# Patient Record
Sex: MALE | Race: OTHER | HISPANIC OR LATINO
[De-identification: names, ages, dates, MRNs, and addresses within clinical notes are randomized per-mention and may not be internally consistent; named-entity substitution may affect disease eponyms.]

---

## 2017-06-27 ENCOUNTER — FORM ENCOUNTER (OUTPATIENT)
Age: 82
End: 2017-06-27

## 2017-09-19 ENCOUNTER — FORM ENCOUNTER (OUTPATIENT)
Age: 82
End: 2017-09-19

## 2021-11-25 ENCOUNTER — INPATIENT (INPATIENT)
Facility: HOSPITAL | Age: 86
LOS: 3 days | Discharge: HOME | DRG: 177 | End: 2021-11-29
Attending: STUDENT IN AN ORGANIZED HEALTH CARE EDUCATION/TRAINING PROGRAM | Admitting: STUDENT IN AN ORGANIZED HEALTH CARE EDUCATION/TRAINING PROGRAM
Payer: COMMERCIAL

## 2021-11-25 VITALS
WEIGHT: 147.05 LBS | RESPIRATION RATE: 18 BRPM | OXYGEN SATURATION: 95 % | HEART RATE: 118 BPM | SYSTOLIC BLOOD PRESSURE: 119 MMHG | HEIGHT: 65 IN | DIASTOLIC BLOOD PRESSURE: 74 MMHG | TEMPERATURE: 103 F

## 2021-11-25 DIAGNOSIS — U07.1 COVID-19: ICD-10-CM

## 2021-11-25 DIAGNOSIS — Z29.9 ENCOUNTER FOR PROPHYLACTIC MEASURES, UNSPECIFIED: ICD-10-CM

## 2021-11-25 DIAGNOSIS — Z78.9 OTHER SPECIFIED HEALTH STATUS: Chronic | ICD-10-CM

## 2021-11-25 DIAGNOSIS — J96.01 ACUTE RESPIRATORY FAILURE WITH HYPOXIA: ICD-10-CM

## 2021-11-25 DIAGNOSIS — N40.0 BENIGN PROSTATIC HYPERPLASIA WITHOUT LOWER URINARY TRACT SYMPTOMS: ICD-10-CM

## 2021-11-25 LAB
ALBUMIN SERPL ELPH-MCNC: 3.5 G/DL — SIGNIFICANT CHANGE UP (ref 3.5–5)
ALP SERPL-CCNC: 93 U/L — SIGNIFICANT CHANGE UP (ref 40–120)
ALT FLD-CCNC: 22 U/L DA — SIGNIFICANT CHANGE UP (ref 10–60)
ANION GAP SERPL CALC-SCNC: 6 MMOL/L — SIGNIFICANT CHANGE UP (ref 5–17)
APPEARANCE UR: CLEAR — SIGNIFICANT CHANGE UP
APTT BLD: 34.3 SEC — SIGNIFICANT CHANGE UP (ref 27.5–35.5)
AST SERPL-CCNC: 18 U/L — SIGNIFICANT CHANGE UP (ref 10–40)
BASOPHILS # BLD AUTO: 0.04 K/UL — SIGNIFICANT CHANGE UP (ref 0–0.2)
BASOPHILS NFR BLD AUTO: 0.4 % — SIGNIFICANT CHANGE UP (ref 0–2)
BILIRUB SERPL-MCNC: 0.8 MG/DL — SIGNIFICANT CHANGE UP (ref 0.2–1.2)
BILIRUB UR-MCNC: NEGATIVE — SIGNIFICANT CHANGE UP
BUN SERPL-MCNC: 14 MG/DL — SIGNIFICANT CHANGE UP (ref 7–18)
CALCIUM SERPL-MCNC: 8.9 MG/DL — SIGNIFICANT CHANGE UP (ref 8.4–10.5)
CHLORIDE SERPL-SCNC: 101 MMOL/L — SIGNIFICANT CHANGE UP (ref 96–108)
CO2 SERPL-SCNC: 27 MMOL/L — SIGNIFICANT CHANGE UP (ref 22–31)
COLOR SPEC: YELLOW — SIGNIFICANT CHANGE UP
CREAT SERPL-MCNC: 1.21 MG/DL — SIGNIFICANT CHANGE UP (ref 0.5–1.3)
DIFF PNL FLD: ABNORMAL
EOSINOPHIL # BLD AUTO: 0.03 K/UL — SIGNIFICANT CHANGE UP (ref 0–0.5)
EOSINOPHIL NFR BLD AUTO: 0.3 % — SIGNIFICANT CHANGE UP (ref 0–6)
GLUCOSE SERPL-MCNC: 214 MG/DL — HIGH (ref 70–99)
GLUCOSE UR QL: 50 MG/DL
HCT VFR BLD CALC: 48.4 % — SIGNIFICANT CHANGE UP (ref 39–50)
HGB BLD-MCNC: 16 G/DL — SIGNIFICANT CHANGE UP (ref 13–17)
IMM GRANULOCYTES NFR BLD AUTO: 0.4 % — SIGNIFICANT CHANGE UP (ref 0–1.5)
INR BLD: 1.16 RATIO — SIGNIFICANT CHANGE UP (ref 0.88–1.16)
KETONES UR-MCNC: ABNORMAL
LACTATE SERPL-SCNC: 1.9 MMOL/L — SIGNIFICANT CHANGE UP (ref 0.7–2)
LEUKOCYTE ESTERASE UR-ACNC: NEGATIVE — SIGNIFICANT CHANGE UP
LYMPHOCYTES # BLD AUTO: 0.62 K/UL — LOW (ref 1–3.3)
LYMPHOCYTES # BLD AUTO: 5.9 % — LOW (ref 13–44)
MCHC RBC-ENTMCNC: 30 PG — SIGNIFICANT CHANGE UP (ref 27–34)
MCHC RBC-ENTMCNC: 33.1 GM/DL — SIGNIFICANT CHANGE UP (ref 32–36)
MCV RBC AUTO: 90.6 FL — SIGNIFICANT CHANGE UP (ref 80–100)
MONOCYTES # BLD AUTO: 0.54 K/UL — SIGNIFICANT CHANGE UP (ref 0–0.9)
MONOCYTES NFR BLD AUTO: 5.2 % — SIGNIFICANT CHANGE UP (ref 2–14)
NEUTROPHILS # BLD AUTO: 9.16 K/UL — HIGH (ref 1.8–7.4)
NEUTROPHILS NFR BLD AUTO: 87.8 % — HIGH (ref 43–77)
NITRITE UR-MCNC: NEGATIVE — SIGNIFICANT CHANGE UP
NRBC # BLD: 0 /100 WBCS — SIGNIFICANT CHANGE UP (ref 0–0)
PH UR: 5 — SIGNIFICANT CHANGE UP (ref 5–8)
PLATELET # BLD AUTO: 276 K/UL — SIGNIFICANT CHANGE UP (ref 150–400)
POTASSIUM SERPL-MCNC: 4.2 MMOL/L — SIGNIFICANT CHANGE UP (ref 3.5–5.3)
POTASSIUM SERPL-SCNC: 4.2 MMOL/L — SIGNIFICANT CHANGE UP (ref 3.5–5.3)
PROT SERPL-MCNC: 7.1 G/DL — SIGNIFICANT CHANGE UP (ref 6–8.3)
PROT UR-MCNC: 30 MG/DL
PROTHROM AB SERPL-ACNC: 13.7 SEC — HIGH (ref 10.6–13.6)
RBC # BLD: 5.34 M/UL — SIGNIFICANT CHANGE UP (ref 4.2–5.8)
RBC # FLD: 13.8 % — SIGNIFICANT CHANGE UP (ref 10.3–14.5)
SARS-COV-2 RNA SPEC QL NAA+PROBE: DETECTED
SODIUM SERPL-SCNC: 134 MMOL/L — LOW (ref 135–145)
SP GR SPEC: 1.02 — SIGNIFICANT CHANGE UP (ref 1.01–1.02)
UROBILINOGEN FLD QL: 1
WBC # BLD: 10.43 K/UL — SIGNIFICANT CHANGE UP (ref 3.8–10.5)
WBC # FLD AUTO: 10.43 K/UL — SIGNIFICANT CHANGE UP (ref 3.8–10.5)

## 2021-11-25 PROCEDURE — 71045 X-RAY EXAM CHEST 1 VIEW: CPT | Mod: 26

## 2021-11-25 PROCEDURE — 99285 EMERGENCY DEPT VISIT HI MDM: CPT

## 2021-11-25 PROCEDURE — 99223 1ST HOSP IP/OBS HIGH 75: CPT

## 2021-11-25 RX ORDER — ACETAMINOPHEN 500 MG
650 TABLET ORAL ONCE
Refills: 0 | Status: COMPLETED | OUTPATIENT
Start: 2021-11-25 | End: 2021-11-25

## 2021-11-25 RX ORDER — SODIUM CHLORIDE 9 MG/ML
1000 INJECTION, SOLUTION INTRAVENOUS
Refills: 0 | Status: DISCONTINUED | OUTPATIENT
Start: 2021-11-25 | End: 2021-11-29

## 2021-11-25 RX ORDER — DEXTROSE 50 % IN WATER 50 %
15 SYRINGE (ML) INTRAVENOUS ONCE
Refills: 0 | Status: DISCONTINUED | OUTPATIENT
Start: 2021-11-25 | End: 2021-11-29

## 2021-11-25 RX ORDER — ASPIRIN/CALCIUM CARB/MAGNESIUM 324 MG
81 TABLET ORAL DAILY
Refills: 0 | Status: DISCONTINUED | OUTPATIENT
Start: 2021-11-25 | End: 2021-11-29

## 2021-11-25 RX ORDER — ALBUTEROL 90 UG/1
2 AEROSOL, METERED ORAL EVERY 6 HOURS
Refills: 0 | Status: DISCONTINUED | OUTPATIENT
Start: 2021-11-25 | End: 2021-11-29

## 2021-11-25 RX ORDER — DEXAMETHASONE 0.5 MG/5ML
6 ELIXIR ORAL DAILY
Refills: 0 | Status: DISCONTINUED | OUTPATIENT
Start: 2021-11-25 | End: 2021-11-29

## 2021-11-25 RX ORDER — REMDESIVIR 5 MG/ML
200 INJECTION INTRAVENOUS EVERY 24 HOURS
Refills: 0 | Status: COMPLETED | OUTPATIENT
Start: 2021-11-25 | End: 2021-11-26

## 2021-11-25 RX ORDER — TAMSULOSIN HYDROCHLORIDE 0.4 MG/1
0.4 CAPSULE ORAL AT BEDTIME
Refills: 0 | Status: DISCONTINUED | OUTPATIENT
Start: 2021-11-25 | End: 2021-11-29

## 2021-11-25 RX ORDER — REMDESIVIR 5 MG/ML
INJECTION INTRAVENOUS
Refills: 0 | Status: DISCONTINUED | OUTPATIENT
Start: 2021-11-25 | End: 2021-11-29

## 2021-11-25 RX ORDER — GLUCAGON INJECTION, SOLUTION 0.5 MG/.1ML
1 INJECTION, SOLUTION SUBCUTANEOUS ONCE
Refills: 0 | Status: DISCONTINUED | OUTPATIENT
Start: 2021-11-25 | End: 2021-11-29

## 2021-11-25 RX ORDER — DEXTROSE 50 % IN WATER 50 %
25 SYRINGE (ML) INTRAVENOUS ONCE
Refills: 0 | Status: DISCONTINUED | OUTPATIENT
Start: 2021-11-25 | End: 2021-11-29

## 2021-11-25 RX ORDER — ENOXAPARIN SODIUM 100 MG/ML
40 INJECTION SUBCUTANEOUS DAILY
Refills: 0 | Status: DISCONTINUED | OUTPATIENT
Start: 2021-11-25 | End: 2021-11-29

## 2021-11-25 RX ORDER — BENZOCAINE AND MENTHOL 5; 1 G/100ML; G/100ML
1 LIQUID ORAL EVERY 4 HOURS
Refills: 0 | Status: DISCONTINUED | OUTPATIENT
Start: 2021-11-25 | End: 2021-11-29

## 2021-11-25 RX ORDER — IBUPROFEN 200 MG
600 TABLET ORAL ONCE
Refills: 0 | Status: COMPLETED | OUTPATIENT
Start: 2021-11-25 | End: 2021-11-25

## 2021-11-25 RX ORDER — ACETAMINOPHEN 500 MG
650 TABLET ORAL EVERY 6 HOURS
Refills: 0 | Status: DISCONTINUED | OUTPATIENT
Start: 2021-11-25 | End: 2021-11-29

## 2021-11-25 RX ORDER — METOCLOPRAMIDE HCL 10 MG
10 TABLET ORAL ONCE
Refills: 0 | Status: COMPLETED | OUTPATIENT
Start: 2021-11-25 | End: 2021-11-25

## 2021-11-25 RX ORDER — DEXTROSE 50 % IN WATER 50 %
12.5 SYRINGE (ML) INTRAVENOUS ONCE
Refills: 0 | Status: DISCONTINUED | OUTPATIENT
Start: 2021-11-25 | End: 2021-11-29

## 2021-11-25 RX ORDER — INSULIN LISPRO 100/ML
VIAL (ML) SUBCUTANEOUS
Refills: 0 | Status: DISCONTINUED | OUTPATIENT
Start: 2021-11-25 | End: 2021-11-29

## 2021-11-25 RX ORDER — SODIUM CHLORIDE 9 MG/ML
1000 INJECTION INTRAMUSCULAR; INTRAVENOUS; SUBCUTANEOUS ONCE
Refills: 0 | Status: COMPLETED | OUTPATIENT
Start: 2021-11-25 | End: 2021-11-25

## 2021-11-25 RX ADMIN — Medication 650 MILLIGRAM(S): at 18:15

## 2021-11-25 RX ADMIN — Medication 600 MILLIGRAM(S): at 19:25

## 2021-11-25 RX ADMIN — Medication 600 MILLIGRAM(S): at 19:55

## 2021-11-25 RX ADMIN — Medication 650 MILLIGRAM(S): at 19:00

## 2021-11-25 RX ADMIN — SODIUM CHLORIDE 1000 MILLILITER(S): 9 INJECTION INTRAMUSCULAR; INTRAVENOUS; SUBCUTANEOUS at 19:26

## 2021-11-25 RX ADMIN — Medication 10 MILLIGRAM(S): at 21:21

## 2021-11-25 RX ADMIN — Medication 104 MILLIGRAM(S): at 20:21

## 2021-11-25 RX ADMIN — SODIUM CHLORIDE 1000 MILLILITER(S): 9 INJECTION INTRAMUSCULAR; INTRAVENOUS; SUBCUTANEOUS at 18:26

## 2021-11-25 NOTE — ED ADULT TRIAGE NOTE - CHIEF COMPLAINT QUOTE
pt explored to family with + covid, and today has fever 100.1 at home and sore throat. pt explores to family with + covid, and today has fever 100.1 at home and sore throat.

## 2021-11-25 NOTE — ED PROVIDER NOTE - CLINICAL SUMMARY MEDICAL DECISION MAKING FREE TEXT BOX
Patient presenting sore throat, + covid exposure. will obtain lab, cxr, ua. assess for infection. ed obs and reassess

## 2021-11-25 NOTE — ED PROVIDER NOTE - OBJECTIVE STATEMENT
89 y/o M, w/ history of HTN, presents w/ sore throat x3 days, noting fever at home today. Patient had family member that tested positive for COVID. Patient was tested as well last week, but was negative. Patient denies cough, nausea, vomiting, headache, diarrhea, or any other symptoms. NKDA.

## 2021-11-25 NOTE — H&P ADULT - ATTENDING COMMENTS
Patient is an 89 year old male with a PMH of HTN who presented with a chief complaint of fever.      T(C): 36.6 (11-25-21 @ 22:03), Max: 39.3 (11-25-21 @ 17:08)  T(F): 97.8 (11-25-21 @ 22:03), Max: 102.8 (11-25-21 @ 17:08)  HR: 84 (11-25-21 @ 22:03) (84 - 118)  BP: 112/69 (11-25-21 @ 22:03) (108/64 - 119/74)  RR: 18 (11-25-21 @ 22:03) (18 - 18)  SpO2: 96% (11-25-21 @ 22:03) (89% - 96%)  Wt(kg): --    P/E: As above MAR    A/P:    Shortness of Breath due to Severe COVID Infection:  -COVID= Detected  -At time of examination in the ED, patient is requiring supplemental oxygen.  Therefore, patient can be categorized as Severe Disease.  -Will start patient on Lovenox  -In addition, will start patient on Dexamethasone 6mg IV Daily for 9 days (as he was recently hospitalized) or until discharge (whichever is shorter) and Remdesivir  -Will also start patient on Albuterol MDI Q2H PRN as well as Mucinex PRN and Robitussin PRN  -Will send ESR, CRP, Procalcitonin  -Will continue to trend d-dimer, CRP, LDH, Troponin, Ferritin, CPK  -Tylenol PRN for fever  -Will continue to provide patient with any and all additional supportive care as necessary    Headache:  -Ibuprofen PRN    Uncontrolled Blood Glucose:  -Hemoglobin A1c with AM labs  -Blood Glucose Monitoring ACHS  -Regular Insulin Slidign Scale ACHS  -Carb Controlled, Heart Healthy, Renal (Non-Dialysis) diet as tolerated    Acute Renal Insufficiency:  -eGFR= 53 on 11/25/2021  -Can consider sending Urinary Electrolytes (Sodium, Potassium, Creatinine, Chloride)  -Will continue with IVF hydration  -If no demonstrated improvement, can consider obtaining Bilateral Renal Sonogram    Pseudohyponatremia:  -Sodium, corrected= 137 mEq/L  -Will continue with IVF hydration    Hypoalbuminemia:  -Nutrition Consult    GI/DVT PPx:  -Pepcid  -Lovenox

## 2021-11-25 NOTE — H&P ADULT - PROBLEM SELECTOR PLAN 1
- Patient presented with sore throat and fever   - COVID -19 positive   - Clear chest X ray   - Patient was desaturated on RA; now on 3L NC   - Will start Decadrone 6mg IV for 9 days and Remdisivir for 5 days   - Albuterol PRN for wheezes/SOB  - Tylenol for fever   - Will send ESR, CRP, Ferritin and Procal  - Monitor Vitals

## 2021-11-25 NOTE — H&P ADULT - NSHPPHYSICALEXAM_GEN_ALL_CORE
ICU Vital Signs Last 24 Hrs  T(C): 38.2 (25 Nov 2021 20:35), Max: 39.3 (25 Nov 2021 17:08)  T(F): 100.8 (25 Nov 2021 20:35), Max: 102.8 (25 Nov 2021 17:08)  HR: 90 (25 Nov 2021 20:35) (90 - 118)  BP: 108/64 (25 Nov 2021 20:35) (108/64 - 119/74)  RR: 18 (25 Nov 2021 20:35) (18 - 18)  SpO2: 94% (25 Nov 2021 20:35) (89% - 95%)    GENERAL: NAD, lying in bed comfortably  HEAD:  Atraumatic, Normocephalic  EYES: EOMI, PERRLA, conjunctiva and sclera clear  ENT: Moist mucous membranes  NECK: Supple, No JVD  CHEST/LUNG: Clear to auscultation bilaterally; No rales, rhonchi, wheezing, or rubs. Unlabored respirations  HEART: Regular rate and rhythm; No murmurs, rubs, or gallops  ABDOMEN: Bowel sounds present; Soft, Nontender, Nondistended. No hepatomegally  EXTREMITIES:  2+ Peripheral Pulses, brisk capillary refill. No clubbing, cyanosis, or edema  NERVOUS SYSTEM:  Alert & Oriented X3, speech clear. No deficits   MSK: FROM all 4 extremities, full and equal strength  SKIN: No rashes or lesions ICU Vital Signs Last 24 Hrs  T(C): 38.2 (25 Nov 2021 20:35), Max: 39.3 (25 Nov 2021 17:08)  T(F): 100.8 (25 Nov 2021 20:35), Max: 102.8 (25 Nov 2021 17:08)  HR: 90 (25 Nov 2021 20:35) (90 - 118)  BP: 108/64 (25 Nov 2021 20:35) (108/64 - 119/74)  RR: 18 (25 Nov 2021 20:35) (18 - 18)  SpO2: 94% (25 Nov 2021 20:35) (89% - 95%)    GENERAL: NAD, lying in bed comfortably on 3L NC  HEAD:  Atraumatic, Normocephalic  EYES: EOMI, PERRLA, conjunctiva and sclera clear  ENT: Moist mucous membranes  NECK: Supple, No JVD  CHEST/LUNG: Clear to auscultation bilaterally; No rales, rhonchi, wheezing, or rubs. Unlabored respirations  HEART: Regular rate and rhythm; No murmurs, rubs, or gallops  ABDOMEN: Bowel sounds present; Soft, Nontender, Nondistended. No hepatomegally  EXTREMITIES:  2+ Peripheral Pulses, brisk capillary refill. No clubbing, cyanosis, or edema  NERVOUS SYSTEM:  Alert & Oriented X3, speech clear. No deficits   MSK: FROM all 4 extremities, full and equal strength  SKIN: No rashes or lesions

## 2021-11-25 NOTE — H&P ADULT - HISTORY OF PRESENT ILLNESS
89 y/o M, w/ history of HTN, presents w/ sore throat x3 days, noting fever at home today. Patient had family member that tested positive for COVID. Patient was tested as well last week, but was negative. Patient denies cough, nausea, vomiting, headache, diarrhea, or any other symptoms. NKDA. 89 years old Male Frisian speaking with history of BPH presents to the hospital with sore throat for last 3 days, Spiking fever today 101. Patient had family member that tested positive for COVID. Patient was tested as well last week, but was negative. During the interview, He was complaining of headache and chest tightness. Patient denies cough, nausea, vomiting, headache, diarrhea, or any other symptoms.

## 2021-11-25 NOTE — ED ADULT TRIAGE NOTE - AS TEMP SITE
All puncture sites for procedure 9/23/19 continue to achieve hemostasis. No further drainage noted to sites since transfer from PACU. Will continue to monitor.    oral

## 2021-11-25 NOTE — H&P ADULT - NSHPREVIEWOFSYSTEMS_GEN_ALL_CORE
REVIEW OF SYSTEMS:  CONSTITUTIONAL: Fever  EYES/ENT: No visual changes;  No vertigo or throat pain   RESPIRATORY: No cough, wheezing, hemoptysis; No shortness of breath  CARDIOVASCULAR: mild chest pain  GASTROINTESTINAL: No abdominal  pain. No nausea, vomiting. No diarrhea or constipation. No melena or hematochezia.  GENITOURINARY: No dysuria, frequency or hematuria  NEUROLOGICAL: No numbness or weakness  SKIN: No itching, rashes

## 2021-11-25 NOTE — ED ADULT NURSE NOTE - NSIMPLEMENTINTERV_GEN_ALL_ED
Implemented All Fall with Harm Risk Interventions:  [place] to call system. Call bell, personal items and telephone within reach. Instruct patient to call for assistance. Room bathroom lighting operational. Non-slip footwear when patient is off stretcher. Physically safe environment: no spills, clutter or unnecessary equipment. Stretcher in lowest position, wheels locked, appropriate side rails in place. Provide visual cue, wrist band, yellow gown, etc. Monitor gait and stability. Monitor for mental status changes and reorient to person, place, and time. Review medications for side effects contributing to fall risk. Reinforce activity limits and safety measures with patient and family. Provide visual clues: red socks.

## 2021-11-25 NOTE — ED ADULT NURSE NOTE - OBJECTIVE STATEMENT
Patient presents with c/o cough x days, high fever and sore throat today, bi chest pain, SOB, back pain noted

## 2021-11-26 LAB
A1C WITH ESTIMATED AVERAGE GLUCOSE RESULT: 6.7 % — HIGH (ref 4–5.6)
ALBUMIN SERPL ELPH-MCNC: 2.7 G/DL — LOW (ref 3.5–5)
ALBUMIN SERPL ELPH-MCNC: 2.8 G/DL — LOW (ref 3.5–5)
ALP SERPL-CCNC: 70 U/L — SIGNIFICANT CHANGE UP (ref 40–120)
ALP SERPL-CCNC: 75 U/L — SIGNIFICANT CHANGE UP (ref 40–120)
ALT FLD-CCNC: 19 U/L DA — SIGNIFICANT CHANGE UP (ref 10–60)
ALT FLD-CCNC: 20 U/L DA — SIGNIFICANT CHANGE UP (ref 10–60)
ANION GAP SERPL CALC-SCNC: 8 MMOL/L — SIGNIFICANT CHANGE UP (ref 5–17)
AST SERPL-CCNC: 15 U/L — SIGNIFICANT CHANGE UP (ref 10–40)
AST SERPL-CCNC: 15 U/L — SIGNIFICANT CHANGE UP (ref 10–40)
BASOPHILS # BLD AUTO: 0.04 K/UL — SIGNIFICANT CHANGE UP (ref 0–0.2)
BASOPHILS NFR BLD AUTO: 0.5 % — SIGNIFICANT CHANGE UP (ref 0–2)
BILIRUB DIRECT SERPL-MCNC: 0.2 MG/DL — SIGNIFICANT CHANGE UP (ref 0–0.3)
BILIRUB INDIRECT FLD-MCNC: 0.6 MG/DL — SIGNIFICANT CHANGE UP (ref 0.2–1)
BILIRUB SERPL-MCNC: 0.8 MG/DL — SIGNIFICANT CHANGE UP (ref 0.2–1.2)
BILIRUB SERPL-MCNC: 0.8 MG/DL — SIGNIFICANT CHANGE UP (ref 0.2–1.2)
BUN SERPL-MCNC: 15 MG/DL — SIGNIFICANT CHANGE UP (ref 7–18)
CALCIUM SERPL-MCNC: 8.5 MG/DL — SIGNIFICANT CHANGE UP (ref 8.4–10.5)
CHLORIDE SERPL-SCNC: 106 MMOL/L — SIGNIFICANT CHANGE UP (ref 96–108)
CO2 SERPL-SCNC: 25 MMOL/L — SIGNIFICANT CHANGE UP (ref 22–31)
COVID-19 NUCLEOCAPSID GAM AB INTERP: NEGATIVE — SIGNIFICANT CHANGE UP
COVID-19 NUCLEOCAPSID TOTAL GAM ANTIBODY RESULT: 0.07 INDEX — SIGNIFICANT CHANGE UP
COVID-19 SPIKE DOMAIN AB INTERP: POSITIVE
COVID-19 SPIKE DOMAIN ANTIBODY RESULT: >250 U/ML — HIGH
CREAT SERPL-MCNC: 0.8 MG/DL — SIGNIFICANT CHANGE UP (ref 0.5–1.3)
CRP SERPL-MCNC: 56 MG/L — HIGH
D DIMER BLD IA.RAPID-MCNC: 197 NG/ML DDU — SIGNIFICANT CHANGE UP
EOSINOPHIL # BLD AUTO: 0.19 K/UL — SIGNIFICANT CHANGE UP (ref 0–0.5)
EOSINOPHIL NFR BLD AUTO: 2.4 % — SIGNIFICANT CHANGE UP (ref 0–6)
ERYTHROCYTE [SEDIMENTATION RATE] IN BLOOD: 6 MM/HR — SIGNIFICANT CHANGE UP (ref 0–20)
ESTIMATED AVERAGE GLUCOSE: 146 MG/DL — HIGH (ref 68–114)
GLUCOSE BLDC GLUCOMTR-MCNC: 147 MG/DL — HIGH (ref 70–99)
GLUCOSE BLDC GLUCOMTR-MCNC: 175 MG/DL — HIGH (ref 70–99)
GLUCOSE BLDC GLUCOMTR-MCNC: 187 MG/DL — HIGH (ref 70–99)
GLUCOSE BLDC GLUCOMTR-MCNC: 195 MG/DL — HIGH (ref 70–99)
GLUCOSE SERPL-MCNC: 128 MG/DL — HIGH (ref 70–99)
HCT VFR BLD CALC: 42.6 % — SIGNIFICANT CHANGE UP (ref 39–50)
HGB BLD-MCNC: 14 G/DL — SIGNIFICANT CHANGE UP (ref 13–17)
IMM GRANULOCYTES NFR BLD AUTO: 0.5 % — SIGNIFICANT CHANGE UP (ref 0–1.5)
INR BLD: 1.19 RATIO — HIGH (ref 0.88–1.16)
LYMPHOCYTES # BLD AUTO: 0.77 K/UL — LOW (ref 1–3.3)
LYMPHOCYTES # BLD AUTO: 9.6 % — LOW (ref 13–44)
MAGNESIUM SERPL-MCNC: 2 MG/DL — SIGNIFICANT CHANGE UP (ref 1.6–2.6)
MCHC RBC-ENTMCNC: 29.9 PG — SIGNIFICANT CHANGE UP (ref 27–34)
MCHC RBC-ENTMCNC: 32.9 GM/DL — SIGNIFICANT CHANGE UP (ref 32–36)
MCV RBC AUTO: 91 FL — SIGNIFICANT CHANGE UP (ref 80–100)
MONOCYTES # BLD AUTO: 0.56 K/UL — SIGNIFICANT CHANGE UP (ref 0–0.9)
MONOCYTES NFR BLD AUTO: 7 % — SIGNIFICANT CHANGE UP (ref 2–14)
NEUTROPHILS # BLD AUTO: 6.38 K/UL — SIGNIFICANT CHANGE UP (ref 1.8–7.4)
NEUTROPHILS NFR BLD AUTO: 80 % — HIGH (ref 43–77)
NRBC # BLD: 0 /100 WBCS — SIGNIFICANT CHANGE UP (ref 0–0)
PHOSPHATE SERPL-MCNC: 3 MG/DL — SIGNIFICANT CHANGE UP (ref 2.5–4.5)
PLATELET # BLD AUTO: 218 K/UL — SIGNIFICANT CHANGE UP (ref 150–400)
POTASSIUM SERPL-MCNC: 3.7 MMOL/L — SIGNIFICANT CHANGE UP (ref 3.5–5.3)
POTASSIUM SERPL-SCNC: 3.7 MMOL/L — SIGNIFICANT CHANGE UP (ref 3.5–5.3)
PROT SERPL-MCNC: 5.9 G/DL — LOW (ref 6–8.3)
PROT SERPL-MCNC: 6 G/DL — SIGNIFICANT CHANGE UP (ref 6–8.3)
PROTHROM AB SERPL-ACNC: 14.1 SEC — HIGH (ref 10.6–13.6)
RBC # BLD: 4.68 M/UL — SIGNIFICANT CHANGE UP (ref 4.2–5.8)
RBC # FLD: 14.3 % — SIGNIFICANT CHANGE UP (ref 10.3–14.5)
SARS-COV-2 IGG+IGM SERPL QL IA: 0.07 INDEX — SIGNIFICANT CHANGE UP
SARS-COV-2 IGG+IGM SERPL QL IA: >250 U/ML — HIGH
SARS-COV-2 IGG+IGM SERPL QL IA: NEGATIVE — SIGNIFICANT CHANGE UP
SARS-COV-2 IGG+IGM SERPL QL IA: POSITIVE
SODIUM SERPL-SCNC: 139 MMOL/L — SIGNIFICANT CHANGE UP (ref 135–145)
WBC # BLD: 7.98 K/UL — SIGNIFICANT CHANGE UP (ref 3.8–10.5)
WBC # FLD AUTO: 7.98 K/UL — SIGNIFICANT CHANGE UP (ref 3.8–10.5)

## 2021-11-26 PROCEDURE — 99233 SBSQ HOSP IP/OBS HIGH 50: CPT

## 2021-11-26 RX ORDER — REMDESIVIR 5 MG/ML
100 INJECTION INTRAVENOUS EVERY 24 HOURS
Refills: 0 | Status: DISCONTINUED | OUTPATIENT
Start: 2021-11-27 | End: 2021-11-29

## 2021-11-26 RX ADMIN — Medication 1: at 12:22

## 2021-11-26 RX ADMIN — Medication 1: at 17:36

## 2021-11-26 RX ADMIN — REMDESIVIR 500 MILLIGRAM(S): 5 INJECTION INTRAVENOUS at 06:00

## 2021-11-26 RX ADMIN — Medication 6 MILLIGRAM(S): at 06:00

## 2021-11-26 RX ADMIN — ENOXAPARIN SODIUM 40 MILLIGRAM(S): 100 INJECTION SUBCUTANEOUS at 12:22

## 2021-11-26 RX ADMIN — Medication 81 MILLIGRAM(S): at 12:22

## 2021-11-26 RX ADMIN — TAMSULOSIN HYDROCHLORIDE 0.4 MILLIGRAM(S): 0.4 CAPSULE ORAL at 21:46

## 2021-11-26 NOTE — PROGRESS NOTE ADULT - PROBLEM SELECTOR PLAN 1
- Patient presented with sore throat and fever   - COVID -19 positive   - Clear chest X ray   - Patient was desaturated on RA; now on 3L NC   - Will start Decadrone 6mg IV for 9 days and Remdisivir for 5 days   - Albuterol PRN for wheezes/SOB  - Tylenol for fever   - Will send ESR, CRP, Ferritin and Procal  - Monitor Vitals - Patient presented with sore throat and fever   - COVID -19 positive   - Clear chest X ray   - Patient was desaturated on RA; now on 3L NC   -  Decadrone 6mg IV for 9 days and Remdisivir for 5 days   - Albuterol PRN for wheezes/SOB  - Tylenol for fever   - Will send ESR, CRP, Ferritin and Procal

## 2021-11-26 NOTE — PROGRESS NOTE ADULT - SUBJECTIVE AND OBJECTIVE BOX
NP Note     HPI:  89 years old Male Pakistani speaking with history of BPH presents to the hospital with sore throat for last 3 days, Spiking fever today 101. Patient had family member that tested positive for COVID. Patient was tested as well last week, but was negative. During the interview, He was complaining of headache and chest tightness. Patient denies cough, nausea, vomiting, headache, diarrhea, or any other symptoms. (2021 21:29)      Patient is a 89y old  Male who presents with a chief complaint of     INTERVAL HPI/OVERNIGHT EVENTS: no new complaints    MEDICATIONS  (STANDING):  aspirin  chewable 81 milliGRAM(s) Oral daily  dexAMETHasone     Tablet 6 milliGRAM(s) Oral daily  dextrose 40% Gel 15 Gram(s) Oral once  dextrose 5%. 1000 milliLiter(s) (50 mL/Hr) IV Continuous <Continuous>  dextrose 5%. 1000 milliLiter(s) (100 mL/Hr) IV Continuous <Continuous>  dextrose 50% Injectable 25 Gram(s) IV Push once  dextrose 50% Injectable 12.5 Gram(s) IV Push once  dextrose 50% Injectable 25 Gram(s) IV Push once  enoxaparin Injectable 40 milliGRAM(s) SubCutaneous daily  glucagon  Injectable 1 milliGRAM(s) IntraMuscular once  insulin lispro (ADMELOG) corrective regimen sliding scale   SubCutaneous three times a day before meals  remdesivir  IVPB   IV Intermittent   tamsulosin 0.4 milliGRAM(s) Oral at bedtime    MEDICATIONS  (PRN):  acetaminophen     Tablet .. 650 milliGRAM(s) Oral every 6 hours PRN Temp greater or equal to 38C (100.4F), Mild Pain (1 - 3)  ALBUTerol    90 MICROgram(s) HFA Inhaler 2 Puff(s) Inhalation every 6 hours PRN Shortness of Breath and/or Wheezing  benzocaine 15 mG/menthol 3.6 mG (Sugar-Free) Lozenge 1 Lozenge Oral every 4 hours PRN Sore Throat      __________________________________________________  REVIEW OF SYSTEMS:    CONSTITUTIONAL: No fever,   EYES: no acute visual disturbances  NECK: No pain or stiffness  RESPIRATORY: No cough; No shortness of breath  CARDIOVASCULAR: No chest pain, no palpitations  GASTROINTESTINAL: No pain. No nausea or vomiting; No diarrhea   NEUROLOGICAL: No headache or numbness, no tremors  MUSCULOSKELETAL: No joint pain, no muscle pain  GENITOURINARY: no dysuria, no frequency, no hesitancy  PSYCHIATRY: no depression , no anxiety  ALL OTHER  ROS negative        Vital Signs Last 24 Hrs  T(C): 36.8 (2021 05:04), Max: 39.3 (2021 17:08)  T(F): 98.3 (2021 05:04), Max: 102.8 (2021 17:08)  HR: 75 (2021 05:04) (73 - 118)  BP: 104/56 (2021 05:04) (102/55 - 119/74)  BP(mean): --  RR: 18 (2021 05:04) (17 - 18)  SpO2: 97% (2021 05:04) (89% - 97%)    ________________________________________________  PHYSICAL EXAM:  GENERAL: NAD  HEENT: Normocephalic;  conjunctivae and sclerae clear; moist mucous membranes;   NECK : supple  CHEST/LUNG: Clear to auscultation bilaterally with good air entry   HEART: S1 S2  regular; no murmurs, gallops or rubs  ABDOMEN: Soft, Nontender, Nondistended; Bowel sounds present  EXTREMITIES: no cyanosis; no edema; no calf tenderness  SKIN: warm and dry; no rash  NERVOUS SYSTEM:  Awake and alert; Oriented  to place, person and time ; no new deficits    _________________________________________________  LABS:                        14.0   7.98  )-----------( 218      ( 2021 07:28 )             42.6     11-    139  |  106  |  15  ----------------------------<  128<H>  3.7   |  25  |  0.80    Ca    8.5      2021 07:28  Phos  3.0       Mg     2.0         TPro  5.9<L>  /  Alb  2.7<L>  /  TBili  0.8  /  DBili  0.2  /  AST  15  /  ALT  19  /  AlkPhos  70  -    PT/INR - ( 2021 07:28 )   PT: 14.1 sec;   INR: 1.19 ratio         PTT - ( 2021 18:00 )  PTT:34.3 sec  Urinalysis Basic - ( 2021 18:00 )    Color: Yellow / Appearance: Clear / S.020 / pH: x  Gluc: x / Ketone: Trace  / Bili: Negative / Urobili: 1   Blood: x / Protein: 30 mg/dL / Nitrite: Negative   Leuk Esterase: Negative / RBC: 2-5 /HPF / WBC 3-5 /HPF   Sq Epi: x / Non Sq Epi: Occasional /HPF / Bacteria: Few /HPF      CAPILLARY BLOOD GLUCOSE      POCT Blood Glucose.: 147 mg/dL (2021 08:03)        RADIOLOGY & ADDITIONAL TESTS:    Imaging  Reviewed:  YES/NO    Consultant(s) Notes Reviewed:   YES/ No      Plan of care was discussed with patient and /or primary care giver; all questions and concerns were addressed  NP Note     HPI:  89 years old Male Paraguayan speaking with history of BPH presents to the hospital with sore throat for last 3 days, Spiking fever today 101. Patient had family member that tested positive for COVID. Patient was tested as well last week, but was negative. During the interview, He was complaining of headache and chest tightness. Patient denies cough, nausea, vomiting, headache, diarrhea, or any other symptoms. (2021 21:29) Admitted for covid pna.    INTERVAL HPI/OVERNIGHT EVENTS:  [id number] used. Patient denies any complaints. Asking when he can go home.    MEDICATIONS  (STANDING):  aspirin  chewable 81 milliGRAM(s) Oral daily  dexAMETHasone     Tablet 6 milliGRAM(s) Oral daily  dextrose 40% Gel 15 Gram(s) Oral once  dextrose 5%. 1000 milliLiter(s) (50 mL/Hr) IV Continuous <Continuous>  dextrose 5%. 1000 milliLiter(s) (100 mL/Hr) IV Continuous <Continuous>  dextrose 50% Injectable 25 Gram(s) IV Push once  dextrose 50% Injectable 12.5 Gram(s) IV Push once  dextrose 50% Injectable 25 Gram(s) IV Push once  enoxaparin Injectable 40 milliGRAM(s) SubCutaneous daily  glucagon  Injectable 1 milliGRAM(s) IntraMuscular once  insulin lispro (ADMELOG) corrective regimen sliding scale   SubCutaneous three times a day before meals  remdesivir  IVPB   IV Intermittent   tamsulosin 0.4 milliGRAM(s) Oral at bedtime    MEDICATIONS  (PRN):  acetaminophen     Tablet .. 650 milliGRAM(s) Oral every 6 hours PRN Temp greater or equal to 38C (100.4F), Mild Pain (1 - 3)  ALBUTerol    90 MICROgram(s) HFA Inhaler 2 Puff(s) Inhalation every 6 hours PRN Shortness of Breath and/or Wheezing  benzocaine 15 mG/menthol 3.6 mG (Sugar-Free) Lozenge 1 Lozenge Oral every 4 hours PRN Sore Throat      __________________________________________________  REVIEW OF SYSTEMS:    CONSTITUTIONAL: No fever,   EYES: no acute visual disturbances  NECK: No pain or stiffness  RESPIRATORY: No cough; No shortness of breath  CARDIOVASCULAR: No chest pain, no palpitations  GASTROINTESTINAL: No pain. No nausea or vomiting; No diarrhea   NEUROLOGICAL: No headache or numbness, no tremors  MUSCULOSKELETAL: No joint pain, no muscle pain  GENITOURINARY: no dysuria, no frequency, no hesitancy  PSYCHIATRY: no depression , no anxiety  ALL OTHER  ROS negative        Vital Signs Last 24 Hrs  T(C): 36.8 (2021 05:04), Max: 39.3 (2021 17:08)  T(F): 98.3 (2021 05:04), Max: 102.8 (2021 17:08)  HR: 75 (2021 05:04) (73 - 118)  BP: 104/56 (2021 05:04) (102/55 - 119/74)  BP(mean): --  RR: 18 (2021 05:04) (17 - 18)  SpO2: 97% (2021 05:04) (89% - 97%)    ________________________________________________  PHYSICAL EXAM:  GENERAL: NAD  HEENT: Normocephalic;  conjunctivae and sclerae clear; moist mucous membranes;   NECK : supple  CHEST/LUNG: Clear to auscultation bilaterally with good air entry   HEART: S1 S2  regular; no murmurs, gallops or rubs  ABDOMEN: Soft, Nontender, Nondistended; Bowel sounds present  EXTREMITIES: no cyanosis; no edema; no calf tenderness  SKIN: warm and dry; no rash  NERVOUS SYSTEM:  Awake and alert; Oriented  to place, person and time ; no new deficits    _________________________________________________  LABS:                        14.0   7.98  )-----------( 218      ( 2021 07:28 )             42.6     -    139  |  106  |  15  ----------------------------<  128<H>  3.7   |  25  |  0.80    Ca    8.5      2021 07:28  Phos  3.0       Mg     2.0         TPro  5.9<L>  /  Alb  2.7<L>  /  TBili  0.8  /  DBili  0.2  /  AST  15  /  ALT  19  /  AlkPhos  70  -    PT/INR - ( 2021 07:28 )   PT: 14.1 sec;   INR: 1.19 ratio         PTT - ( 2021 18:00 )  PTT:34.3 sec  Urinalysis Basic - ( 2021 18:00 )    Color: Yellow / Appearance: Clear / S.020 / pH: x  Gluc: x / Ketone: Trace  / Bili: Negative / Urobili: 1   Blood: x / Protein: 30 mg/dL / Nitrite: Negative   Leuk Esterase: Negative / RBC: 2-5 /HPF / WBC 3-5 /HPF   Sq Epi: x / Non Sq Epi: Occasional /HPF / Bacteria: Few /HPF      CAPILLARY BLOOD GLUCOSE      POCT Blood Glucose.: 147 mg/dL (2021 08:03)        RADIOLOGY & ADDITIONAL TESTS:    Imaging  Reviewed:  YES    Consultant(s) Notes Reviewed:   YES    Plan of care was discussed with patient and daughter Janice at [phone]; all questions and concerns were addressed  NP Note d/w dr. Barnes    HPI:  89 years old Male South African speaking with history of BPH presents to the hospital with sore throat for last 3 days, Spiking fever today 101. Patient had family member that tested positive for COVID. Patient was tested as well last week, but was negative. During the interview, He was complaining of headache and chest tightness. Patient denies cough, nausea, vomiting, headache, diarrhea, or any other symptoms. (2021 21:29) Admitted for covid pna.    INTERVAL HPI/OVERNIGHT EVENTS:  [id number] used. Patient denies any complaints. Asking when he can go home. Of note patient did receive Ph[employer] Vaccine #1 21 and #2 21.    dextrose 5%. 1000 milliLiter(s) (100 mL/Hr) IV Continuous <Continuous>  dextrose 50% Injectable 25 Gram(s) IV Push once  dextrose 50% Injectable 12.5 Gram(s) IV Push once  dextrose 50% Injectable 25 Gram(s) IV Push once  enoxaparin Injectable 40 milliGRAM(s) SubCutaneous daily  glucagon  Injectable 1 milliGRAM(s) IntraMuscular once  insulin lispro (ADMELOG) corrective regimen sliding scale   SubCutaneous three times a day before meals  remdesivir  IVPB   IV Intermittent   tamsulosin 0.4 milliGRAM(s) Oral at bedtime    MEDICATIONS  (PRN):  acetaminophen     Tablet .. 650 milliGRAM(s) Oral every 6 hours PRN Temp greater or equal to 38C (100.4F), Mild Pain (1 - 3)  ALBUTerol    90 MICROgram(s) HFA Inhaler 2 Puff(s) Inhalation every 6 hours PRN Shortness of Breath and/or Wheezing  benzocaine 15 mG/menthol 3.6 mG (Sugar-Free) Lozenge 1 Lozenge Oral every 4 hours PRN Sore Throat      __________________________________________________  REVIEW OF SYSTEMS:    CONSTITUTIONAL: No fever,   EYES: no acute visual disturbances  NECK: No pain or stiffness  RESPIRATORY: No cough; No shortness of breath  CARDIOVASCULAR: No chest pain, no palpitations  GASTROINTESTINAL: No pain. No nausea or vomiting; No diarrhea   NEUROLOGICAL: No headache or numbness, no tremors  MUSCULOSKELETAL: No joint pain, no muscle pain  GENITOURINARY: no dysuria, no frequency, no hesitancy  PSYCHIATRY: no depression , no anxiety  ALL OTHER  ROS negative        Vital Signs Last 24 Hrs  T(C): 36.8 (2021 05:04), Max: 39.3 (2021 17:08)  T(F): 98.3 (2021 05:04), Max: 102.8 (2021 17:08)  HR: 75 (2021 05:04) (73 - 118)  BP: 104/56 (2021 05:04) (102/55 - 119/74)  BP(mean): --  RR: 18 (2021 05:04) (17 - 18)  SpO2: 97% (2021 05:04) (89% - 97%)    ________________________________________________  PHYSICAL EXAM:  GENERAL: NAD  HEENT: Normocephalic;  conjunctivae and sclerae clear; moist mucous membranes;   NECK : supple  CHEST/LUNG: Clear to auscultation bilaterally with good air entry   HEART: S1 S2  regular; no murmurs, gallops or rubs  ABDOMEN: Soft, Nontender, Nondistended; Bowel sounds present  EXTREMITIES: no cyanosis; no edema; no calf tenderness  SKIN: warm and dry; no rash  NERVOUS SYSTEM:  Awake and alert; Oriented  to place, person and time ; no new deficits    _________________________________________________  LABS:                        14.0   7.98  )-----------( 218      ( 2021 07:28 )             42.6         139  |  106  |  15  ----------------------------<  128<H>  3.7   |  25  |  0.80    Ca    8.5      2021 07:28  Phos  3.0       Mg     2.0     -    TPro  5.9<L>  /  Alb  2.7<L>  /  TBili  0.8  /  DBili  0.2  /  AST  15  /  ALT  19  /  AlkPhos  70  -    PT/INR - ( 2021 07:28 )   PT: 14.1 sec;   INR: 1.19 ratio         PTT - ( 2021 18:00 )  PTT:34.3 sec  Urinalysis Basic - ( 2021 18:00 )    Color: Yellow / Appearance: Clear / S.020 / pH: x  Gluc: x / Ketone: Trace  / Bili: Negative / Urobili: 1   Blood: x / Protein: 30 mg/dL / Nitrite: Negative   Leuk Esterase: Negative / RBC: 2-5 /HPF / WBC 3-5 /HPF   Sq Epi: x / Non Sq Epi: Occasional /HPF / Bacteria: Few /HPF      CAPILLARY BLOOD GLUCOSE      POCT Blood Glucose.: 147 mg/dL (2021 08:03)        RADIOLOGY & ADDITIONAL TESTS:    Imaging  Reviewed:  YES    Consultant(s) Notes Reviewed:   YES    Plan of care was discussed with patient and daughter Janice at [phone]; all questions and concerns were addressed

## 2021-11-26 NOTE — PROGRESS NOTE ADULT - ASSESSMENT
89 years old Male Urdu speaking with history of BPH presents to the hospital with sore throat for last 3 days, Spiking fever today 101. Patient was tested positive for COVID. Patient is saturating on 3L NC. Will admit the Patient for acute hypoxic respiratory failure secondary to COVID 89 years old Male Amharic speaking with history of BPH presents to the hospital with sore throat for last 3 days, Spiking fever today 101. Patient was tested positive for COVID. Patient is saturating on 3L NC. Will admit the Patient for acute hypoxic respiratory failure secondary to COVID. Started on remdesivir and dexamethasone. 89 years old Male Syriac speaking with history of BPH presents to the hospital with sore throat for last 3 days, Spiking fever today 101. Patient was tested positive for COVID. Patient is saturating on 3L NC. Will admit the Patient for acute hypoxic respiratory failure secondary to COVID. Started on remdesivir and dexamethasone. Of note patient had been vaccinated with Phizer in April/May 2021.

## 2021-11-26 NOTE — PROGRESS NOTE ADULT - ATTENDING COMMENTS
89 years old Male Kiswahili speaking with history of BPH presents to the hospital with sore throat for last 3 days, Spiking fever today 101. Patient was tested positive for COVID. Patient is saturating on 3L NC. Will admit the Patient for acute hypoxic respiratory failure secondary to COVID.    #Acute hypoxic respiratory failure  #COVID PNA  #Prediabetes  #BPH  - Dexamethasone  - remdesivir  - Lovenox  - Ween O2 as tolerated  - SSI  - Continue Tamsulosin

## 2021-11-27 LAB
ALBUMIN SERPL ELPH-MCNC: 2.9 G/DL — LOW (ref 3.5–5)
ALP SERPL-CCNC: 74 U/L — SIGNIFICANT CHANGE UP (ref 40–120)
ALT FLD-CCNC: 22 U/L DA — SIGNIFICANT CHANGE UP (ref 10–60)
AST SERPL-CCNC: 18 U/L — SIGNIFICANT CHANGE UP (ref 10–40)
BILIRUB DIRECT SERPL-MCNC: 0.1 MG/DL — SIGNIFICANT CHANGE UP (ref 0–0.3)
BILIRUB INDIRECT FLD-MCNC: 0.4 MG/DL — SIGNIFICANT CHANGE UP (ref 0.2–1)
BILIRUB SERPL-MCNC: 0.5 MG/DL — SIGNIFICANT CHANGE UP (ref 0.2–1.2)
CREAT SERPL-MCNC: 0.92 MG/DL — SIGNIFICANT CHANGE UP (ref 0.5–1.3)
CRP SERPL-MCNC: 57 MG/L — HIGH
D DIMER BLD IA.RAPID-MCNC: 161 NG/ML DDU — SIGNIFICANT CHANGE UP
FERRITIN SERPL-MCNC: 414 NG/ML — HIGH (ref 30–400)
GLUCOSE BLDC GLUCOMTR-MCNC: 136 MG/DL — HIGH (ref 70–99)
GLUCOSE BLDC GLUCOMTR-MCNC: 237 MG/DL — HIGH (ref 70–99)
GLUCOSE BLDC GLUCOMTR-MCNC: 252 MG/DL — HIGH (ref 70–99)
GLUCOSE BLDC GLUCOMTR-MCNC: 293 MG/DL — HIGH (ref 70–99)
INR BLD: 1.21 RATIO — HIGH (ref 0.88–1.16)
LDH SERPL L TO P-CCNC: 118 U/L — LOW (ref 120–225)
PROCALCITONIN SERPL-MCNC: 0.2 NG/ML — HIGH (ref 0.02–0.1)
PROT SERPL-MCNC: 6.2 G/DL — SIGNIFICANT CHANGE UP (ref 6–8.3)
PROTHROM AB SERPL-ACNC: 14.3 SEC — HIGH (ref 10.6–13.6)
TROPONIN I, HIGH SENSITIVITY RESULT: 6 NG/L — SIGNIFICANT CHANGE UP

## 2021-11-27 PROCEDURE — 99233 SBSQ HOSP IP/OBS HIGH 50: CPT

## 2021-11-27 RX ORDER — LANOLIN ALCOHOL/MO/W.PET/CERES
3 CREAM (GRAM) TOPICAL AT BEDTIME
Refills: 0 | Status: DISCONTINUED | OUTPATIENT
Start: 2021-11-27 | End: 2021-11-29

## 2021-11-27 RX ADMIN — Medication 6 MILLIGRAM(S): at 06:07

## 2021-11-27 RX ADMIN — Medication 3 MILLIGRAM(S): at 22:25

## 2021-11-27 RX ADMIN — TAMSULOSIN HYDROCHLORIDE 0.4 MILLIGRAM(S): 0.4 CAPSULE ORAL at 22:25

## 2021-11-27 RX ADMIN — ENOXAPARIN SODIUM 40 MILLIGRAM(S): 100 INJECTION SUBCUTANEOUS at 12:21

## 2021-11-27 RX ADMIN — Medication 3: at 16:55

## 2021-11-27 RX ADMIN — REMDESIVIR 500 MILLIGRAM(S): 5 INJECTION INTRAVENOUS at 06:07

## 2021-11-27 RX ADMIN — Medication 3: at 12:22

## 2021-11-27 RX ADMIN — Medication 81 MILLIGRAM(S): at 12:22

## 2021-11-27 NOTE — PROGRESS NOTE ADULT - SUBJECTIVE AND OBJECTIVE BOX
[facility]  Patient is a 89y old  Male who presents with a chief complaint of     SUBJECTIVE / OVERNIGHT EVENTS:  No acute events over night. Denies any fevers/chills, headache, CP, SOB, abd pain, N/V/D, constipation, or leg swelling. Tolerating 2L NC now.      MEDICATIONS  (STANDING):  aspirin  chewable 81 milliGRAM(s) Oral daily  dexAMETHasone     Tablet 6 milliGRAM(s) Oral daily  dextrose 40% Gel 15 Gram(s) Oral once  dextrose 5%. 1000 milliLiter(s) (50 mL/Hr) IV Continuous <Continuous>  dextrose 5%. 1000 milliLiter(s) (100 mL/Hr) IV Continuous <Continuous>  dextrose 50% Injectable 25 Gram(s) IV Push once  dextrose 50% Injectable 12.5 Gram(s) IV Push once  dextrose 50% Injectable 25 Gram(s) IV Push once  enoxaparin Injectable 40 milliGRAM(s) SubCutaneous daily  glucagon  Injectable 1 milliGRAM(s) IntraMuscular once  insulin lispro (ADMELOG) corrective regimen sliding scale   SubCutaneous three times a day before meals  remdesivir  IVPB   IV Intermittent   remdesivir  IVPB 100 milliGRAM(s) IV Intermittent every 24 hours  tamsulosin 0.4 milliGRAM(s) Oral at bedtime    MEDICATIONS  (PRN):  acetaminophen     Tablet .. 650 milliGRAM(s) Oral every 6 hours PRN Temp greater or equal to 38C (100.4F), Mild Pain (1 - 3)  ALBUTerol    90 MICROgram(s) HFA Inhaler 2 Puff(s) Inhalation every 6 hours PRN Shortness of Breath and/or Wheezing  benzocaine 15 mG/menthol 3.6 mG (Sugar-Free) Lozenge 1 Lozenge Oral every 4 hours PRN Sore Throat          OBJECTIVE:  Vital Signs Last 24 Hrs  T(C): 36.6 (2021 04:54), Max: 37.1 (2021 14:40)  T(F): 97.9 (2021 04:54), Max: 98.8 (2021 14:40)  HR: 71 (2021 04:54) (70 - 73)  BP: 113/52 (2021 04:54) (106/62 - 113/52)  BP(mean): --  RR: 18 (2021 04:54) (18 - 18)  SpO2: 97% (2021 04:54) (94% - 97%)    PHYSICAL EXAM:  GENERAL: NAD  HEENT: Normocephalic;  conjunctivae and sclerae clear; moist mucous membranes;   NECK : supple  CHEST/LUNG: Clear to auscultation bilaterally with good air entry   HEART: S1 S2  regular; no murmurs, gallops or rubs  ABDOMEN: Soft, Nontender, Nondistended; Bowel sounds present  EXTREMITIES: no cyanosis; no edema; no calf tenderness  SKIN: warm and dry; no rash  NERVOUS SYSTEM:  Awake and alert; Oriented  to place, person and time ; no new deficits  CAPILLARY BLOOD GLUCOSE      POCT Blood Glucose.: 136 mg/dL (2021 08:18)  POCT Blood Glucose.: 187 mg/dL (2021 20:27)  POCT Blood Glucose.: 195 mg/dL (2021 17:17)  POCT Blood Glucose.: 175 mg/dL (2021 11:46)    I&O's Summary    2021 07:01  -  2021 07:00  --------------------------------------------------------  IN: 0 mL / OUT: 300 mL / NET: -300 mL              LABS:                        14.0   7.98  )-----------( 218      ( 2021 07:28 )             42.6     1127    x   |  x   |  x   ----------------------------<  x   x    |  x   |  0.92    Ca    8.5      2021 07:28  Phos  3.0       Mg     2.0         TPro  6.2  /  Alb  2.9<L>  /  TBili  0.5  /  DBili  0.1  /  AST  18  /  ALT  22  /  AlkPhos  74  27    PT/INR - ( 2021 07:08 )   PT: 14.3 sec;   INR: 1.21 ratio         PTT - ( 2021 18:00 )  PTT:34.3 sec      Urinalysis Basic - ( 2021 18:00 )    Color: Yellow / Appearance: Clear / S.020 / pH: x  Gluc: x / Ketone: Trace  / Bili: Negative / Urobili: 1   Blood: x / Protein: 30 mg/dL / Nitrite: Negative   Leuk Esterase: Negative / RBC: 2-5 /HPF / WBC 3-5 /HPF   Sq Epi: x / Non Sq Epi: Occasional /HPF / Bacteria: Few /HPF          Culture - Blood (collected 2021 23:10)  Source: .Blood Blood-Peripheral  Preliminary Report (2021 01:01):    No growth to date.    Culture - Blood (collected 2021 23:10)  Source: .Blood Blood-Peripheral  Preliminary Report (2021 01:01):    No growth to date.        RADIOLOGY & ADDITIONAL TESTS:

## 2021-11-27 NOTE — PROGRESS NOTE ADULT - ASSESSMENT
89 years old Male Mongolian speaking with history of BPH presents to the hospital with sore throat for last 3 days, Spiking fever today 101. Patient was tested positive for COVID. Patient is saturating on 3L NC. Will admit the Patient for acute hypoxic respiratory failure secondary to COVID.    #Acute hypoxic respiratory failure  #COVID PNA  #Prediabetes  #BPH  - Dexamethasone  - remdesivir  - Lovenox  - Ween O2 as tolerated - 2L NC  - SSI  - Continue Tamsulosin.

## 2021-11-28 ENCOUNTER — TRANSCRIPTION ENCOUNTER (OUTPATIENT)
Age: 86
End: 2021-11-28

## 2021-11-28 LAB
ALBUMIN SERPL ELPH-MCNC: 2.8 G/DL — LOW (ref 3.5–5)
ALP SERPL-CCNC: 67 U/L — SIGNIFICANT CHANGE UP (ref 40–120)
ALT FLD-CCNC: 24 U/L DA — SIGNIFICANT CHANGE UP (ref 10–60)
AST SERPL-CCNC: 17 U/L — SIGNIFICANT CHANGE UP (ref 10–40)
BILIRUB DIRECT SERPL-MCNC: 0.1 MG/DL — SIGNIFICANT CHANGE UP (ref 0–0.3)
BILIRUB INDIRECT FLD-MCNC: 0.3 MG/DL — SIGNIFICANT CHANGE UP (ref 0.2–1)
BILIRUB SERPL-MCNC: 0.4 MG/DL — SIGNIFICANT CHANGE UP (ref 0.2–1.2)
CREAT SERPL-MCNC: 0.87 MG/DL — SIGNIFICANT CHANGE UP (ref 0.5–1.3)
CULTURE RESULTS: SIGNIFICANT CHANGE UP
GLUCOSE BLDC GLUCOMTR-MCNC: 168 MG/DL — HIGH (ref 70–99)
GLUCOSE BLDC GLUCOMTR-MCNC: 196 MG/DL — HIGH (ref 70–99)
GLUCOSE BLDC GLUCOMTR-MCNC: 213 MG/DL — HIGH (ref 70–99)
GLUCOSE BLDC GLUCOMTR-MCNC: 263 MG/DL — HIGH (ref 70–99)
INR BLD: 1.23 RATIO — HIGH (ref 0.88–1.16)
PROT SERPL-MCNC: 6 G/DL — SIGNIFICANT CHANGE UP (ref 6–8.3)
PROTHROM AB SERPL-ACNC: 14.5 SEC — HIGH (ref 10.6–13.6)
SPECIMEN SOURCE: SIGNIFICANT CHANGE UP

## 2021-11-28 PROCEDURE — 99233 SBSQ HOSP IP/OBS HIGH 50: CPT

## 2021-11-28 RX ORDER — INFLUENZA VIRUS VACCINE 15; 15; 15; 15 UG/.5ML; UG/.5ML; UG/.5ML; UG/.5ML
0.7 SUSPENSION INTRAMUSCULAR ONCE
Refills: 0 | Status: DISCONTINUED | OUTPATIENT
Start: 2021-11-28 | End: 2021-11-29

## 2021-11-28 RX ADMIN — Medication 1: at 08:25

## 2021-11-28 RX ADMIN — REMDESIVIR 500 MILLIGRAM(S): 5 INJECTION INTRAVENOUS at 05:13

## 2021-11-28 RX ADMIN — Medication 81 MILLIGRAM(S): at 12:14

## 2021-11-28 RX ADMIN — Medication 3 MILLIGRAM(S): at 22:16

## 2021-11-28 RX ADMIN — Medication 2: at 17:02

## 2021-11-28 RX ADMIN — ENOXAPARIN SODIUM 40 MILLIGRAM(S): 100 INJECTION SUBCUTANEOUS at 12:14

## 2021-11-28 RX ADMIN — Medication 6 MILLIGRAM(S): at 05:07

## 2021-11-28 RX ADMIN — TAMSULOSIN HYDROCHLORIDE 0.4 MILLIGRAM(S): 0.4 CAPSULE ORAL at 22:16

## 2021-11-28 RX ADMIN — Medication 3: at 12:13

## 2021-11-28 NOTE — DISCHARGE NOTE PROVIDER - NSDCMRMEDTOKEN_GEN_ALL_CORE_FT
aspirin 81 mg oral tablet, chewable: 1 tab(s) orally once a day  tamsulosin 0.4 mg oral capsule: 1 cap(s) orally once a day   aspirin 81 mg oral tablet, chewable: 1 tab(s) orally once a day  dexamethasone 6 mg oral tablet: 1 tab(s) orally once a day  tamsulosin 0.4 mg oral capsule: 1 cap(s) orally once a day

## 2021-11-28 NOTE — PROGRESS NOTE ADULT - ASSESSMENT
89 years old Male Romanian speaking with history of BPH presents to the hospital with sore throat for last 3 days, Spiking fever today 101. Patient was tested positive for COVID. Patient is saturating on 3L NC. Will admit the Patient for acute hypoxic respiratory failure secondary to COVID.    #Acute hypoxic respiratory failure  #COVID PNA  #Prediabetes  #BPH  - Ween O2 as tolerated - 2L NC  - Obtain ambulatory O2 sat  - Dexamethasone  - remdesivir  - Lovenox  - SSI  - Continue Tamsulosin.

## 2021-11-28 NOTE — DISCHARGE NOTE PROVIDER - HOSPITAL COURSE
HPI:  89 years old Male Georgian speaking with history of BPH presents to the hospital with sore throat for last 3 days, Spiking fever today 101. Patient had family member that tested positive for COVID. Patient was tested as well last week, but was negative. During the interview, He was complaining of headache and chest tightness. Patient denies cough, nausea, vomiting, headache, diarrhea, or any other symptoms. Admitted for COVID PNA,    HPI:  89 years old Male Macedonian speaking with history of BPH presents to the hospital with sore throat for last 3 days, Spiking fever today 101. Patient had family member that tested positive for COVID. Patient was tested as well last week, but was negative. During the interview, He was complaining of headache and chest tightness. Patient denies cough, nausea, vomiting, headache, diarrhea, or any other symptoms. Admitted for COVID PNA, chest xray showed perihilar infiltrates, he initially required 3L nasal cannula oxygen, but was weaned off to room air and did well.  He completed 4 dose course of Remdesevir and was maintained on dexamethasone.     Now stable for discharge to home.    Please note this is only a summary, refer to the full chart for full details.   HPI:  89 years old Male Macedonian speaking with history of BPH presents to the hospital with sore throat for last 3 days, Spiking fever today 101. Patient had family member that tested positive for COVID. Patient was tested as well last week, but was negative. During the interview, He was complaining of headache and chest tightness. Patient denies cough, nausea, vomiting, headache, diarrhea, or any other symptoms. Admitted for COVID PNA, chest xray showed perihilar infiltrates, he initially required 3L nasal cannula oxygen, but was weaned off to room air and did well.  He completed 4 dose course of Remdesevir and was maintained on dexamethasone.   Pt. ambulated on RA and maintained O2 sat 100%%.  Pt. is stable for discharge to home, agrees with plan.    Please note this is only a summary, refer to the full chart for full details.

## 2021-11-28 NOTE — PROGRESS NOTE ADULT - SUBJECTIVE AND OBJECTIVE BOX
[facility]  Patient is a 89y old  Male who presents with a chief complaint of     SUBJECTIVE / OVERNIGHT EVENTS:  No acute events over night. Denies any fevers/chills, headache, CP, SOB, abd pain, N/V/D, constipation, or leg swelling. Tolerating 2L NC now.      MEDICATIONS  (STANDING):  aspirin  chewable 81 milliGRAM(s) Oral daily  dexAMETHasone     Tablet 6 milliGRAM(s) Oral daily  dextrose 40% Gel 15 Gram(s) Oral once  dextrose 5%. 1000 milliLiter(s) (50 mL/Hr) IV Continuous <Continuous>  dextrose 5%. 1000 milliLiter(s) (100 mL/Hr) IV Continuous <Continuous>  dextrose 50% Injectable 25 Gram(s) IV Push once  dextrose 50% Injectable 12.5 Gram(s) IV Push once  dextrose 50% Injectable 25 Gram(s) IV Push once  enoxaparin Injectable 40 milliGRAM(s) SubCutaneous daily  glucagon  Injectable 1 milliGRAM(s) IntraMuscular once  insulin lispro (ADMELOG) corrective regimen sliding scale   SubCutaneous three times a day before meals  remdesivir  IVPB   IV Intermittent   remdesivir  IVPB 100 milliGRAM(s) IV Intermittent every 24 hours  tamsulosin 0.4 milliGRAM(s) Oral at bedtime    MEDICATIONS  (PRN):  acetaminophen     Tablet .. 650 milliGRAM(s) Oral every 6 hours PRN Temp greater or equal to 38C (100.4F), Mild Pain (1 - 3)  ALBUTerol    90 MICROgram(s) HFA Inhaler 2 Puff(s) Inhalation every 6 hours PRN Shortness of Breath and/or Wheezing  benzocaine 15 mG/menthol 3.6 mG (Sugar-Free) Lozenge 1 Lozenge Oral every 4 hours PRN Sore Throat          OBJECTIVE:  Vital Signs Last 24 Hrs  T(C): 36.6 (2021 04:54), Max: 37.1 (2021 14:40)  T(F): 97.9 (2021 04:54), Max: 98.8 (2021 14:40)  HR: 71 (2021 04:54) (70 - 73)  BP: 113/52 (2021 04:54) (106/62 - 113/52)  BP(mean): --  RR: 18 (2021 04:54) (18 - 18)  SpO2: 97% (2021 04:54) (94% - 97%)    PHYSICAL EXAM:  GENERAL: NAD  HEENT: Normocephalic;  conjunctivae and sclerae clear; moist mucous membranes;   NECK : supple  CHEST/LUNG: Clear to auscultation bilaterally with good air entry   HEART: S1 S2  regular; no murmurs, gallops or rubs  ABDOMEN: Soft, Nontender, Nondistended; Bowel sounds present  EXTREMITIES: no cyanosis; no edema; no calf tenderness  SKIN: warm and dry; no rash  NERVOUS SYSTEM:  Awake and alert; Oriented  to place, person and time ; no new deficits  CAPILLARY BLOOD GLUCOSE      POCT Blood Glucose.: 136 mg/dL (2021 08:18)  POCT Blood Glucose.: 187 mg/dL (2021 20:27)  POCT Blood Glucose.: 195 mg/dL (2021 17:17)  POCT Blood Glucose.: 175 mg/dL (2021 11:46)    I&O's Summary    2021 07:01  -  2021 07:00  --------------------------------------------------------  IN: 0 mL / OUT: 300 mL / NET: -300 mL              LABS:                        14.0   7.98  )-----------( 218      ( 2021 07:28 )             42.6     1127    x   |  x   |  x   ----------------------------<  x   x    |  x   |  0.92    Ca    8.5      2021 07:28  Phos  3.0       Mg     2.0         TPro  6.2  /  Alb  2.9<L>  /  TBili  0.5  /  DBili  0.1  /  AST  18  /  ALT  22  /  AlkPhos  74  27    PT/INR - ( 2021 07:08 )   PT: 14.3 sec;   INR: 1.21 ratio         PTT - ( 2021 18:00 )  PTT:34.3 sec      Urinalysis Basic - ( 2021 18:00 )    Color: Yellow / Appearance: Clear / S.020 / pH: x  Gluc: x / Ketone: Trace  / Bili: Negative / Urobili: 1   Blood: x / Protein: 30 mg/dL / Nitrite: Negative   Leuk Esterase: Negative / RBC: 2-5 /HPF / WBC 3-5 /HPF   Sq Epi: x / Non Sq Epi: Occasional /HPF / Bacteria: Few /HPF          Culture - Blood (collected 2021 23:10)  Source: .Blood Blood-Peripheral  Preliminary Report (2021 01:01):    No growth to date.    Culture - Blood (collected 2021 23:10)  Source: .Blood Blood-Peripheral  Preliminary Report (2021 01:01):    No growth to date.        RADIOLOGY & ADDITIONAL TESTS:

## 2021-11-28 NOTE — CHART NOTE - NSCHARTNOTEFT_GEN_A_CORE
Reviewed pt. with Dr. Barnes earlier today.    ICU Vital Signs Last 24 Hrs  T(C): 36.4 (28 Nov 2021 13:39), Max: 36.4 (27 Nov 2021 21:59)  T(F): 97.6 (28 Nov 2021 13:39), Max: 97.6 (28 Nov 2021 13:39)  HR: 63 (28 Nov 2021 13:39) (63 - 69)  BP: 107/56 (28 Nov 2021 13:39) (105/54 - 111/60)  RR: 16 (28 Nov 2021 13:39) (16 - 18)  SpO2: 96% (28 Nov 2021 13:39) (94% - 98%)    Weaned off oxygen sating 94% on Room Air.  Ambulatory pulseox 91% on room air.  Doing well, pt. wants to go home.  Spoke with family who he lives with.  His room is upstairs, however they can make accomodation for him to stay on first floor until he is feeling better.  Reasonable to discharge home tomorrow.    Aurelio Corey, NP

## 2021-11-28 NOTE — DISCHARGE NOTE PROVIDER - CARE PROVIDER_API CALL
MARTA RIDLEY  [facility]  [address], [address]  [place] [address]  Phone: ([phone]  Fax: ()-  Follow Up Time: 1 week

## 2021-11-28 NOTE — DISCHARGE NOTE PROVIDER - ATTENDING COMMENTS
89 years old Male Italian speaking with history of BPH presents to the hospital with sore throat for last 3 days, Spiking fever today 101. Patient was tested positive for COVID. Patient is saturating on 3L NC. Admitted patient for acute hypoxic respiratory failure secondary to COVID. Treated with dexamethasone and remdesivir. O2 was weened down to RA. Patient with good spO2 on RA during ambulation. Stable to discharge

## 2021-11-28 NOTE — DISCHARGE NOTE PROVIDER - NSDCCPCAREPLAN_GEN_ALL_CORE_FT
PRINCIPAL DISCHARGE DIAGNOSIS  Diagnosis: Acute hypoxemic respiratory failure due to COVID-19  Assessment and Plan of Treatment:        PRINCIPAL DISCHARGE DIAGNOSIS  Diagnosis: Acute hypoxemic respiratory failure due to COVID-19  Assessment and Plan of Treatment: Assessment and Plan of Treatment: You came in with complaints of fatigue, non productive cough , loss of appetite that was going on for a week. You were COVID positive. Upon admission , your oxygen saturation was low on room air and you required oxygen supplementation via nasal canula. You were started on Dexamethasone and  Remdesivir.   CORONAVIRUS INSTRUCTIONS:   Based on your current clinical status and stability, it has been determined that you no longer need hospitalization and can recover while remaining in self-quarantine at home. You should follow the prevention steps below until a healthcare provider or local or state health department says you can return to your normal activities.   1. You should restrict activities outside your home, except for getting medical care.   2. Do not go to work, school, or public areas.   3. Avoid using public transportation, ride-sharing, or taxis.   4. Separate yourself from other people and animals in your home as much as possible.  When you are around other people (e.g., sharing a room or vehicle) you should wear a facemask.  5. Wash your hands often with soap and water for at least 20 seconds, especially after blowing your nose, coughing, or sneezing; going to the bathroom; and before eating or preparing food.  6. Cover your mouth and nose with a tissue when you cough or sneeze. Throw used tissues in a lined trash can. Immediately wash your hands with soap and water for at least 20 seconds  7. High touch surfaces include counters, tabletops, doorknobs, bathroom fixtures, toilets, phones, keyboards, tablets, and bedside tables.  8. Avoid sharing dishes, drinking glasses, cups, eating utensils, towels, or bedding with other people or pets in your home. After using these items, they should be washed thoroughly with soap and water.  You are strongly advised to seek prompt medical attention if your illness worsens or you develop new symptoms like fever or difficulty breathing.       PRINCIPAL DISCHARGE DIAGNOSIS  Diagnosis: Acute hypoxemic respiratory failure due to COVID-19  Assessment and Plan of Treatment: Assessment and Plan of Treatment: You came in with complaints of fatigue, non productive cough , loss of appetite that was going on for a week. You were COVID positive. Upon admission , your oxygen saturation was low on room air and you required oxygen supplementation via nasal canula. You were started on Dexamethasone and  Remdesivir.   CORONAVIRUS INSTRUCTIONS:   Based on your current clinical status and stability, it has been determined that you no longer need hospitalization and can recover while remaining in self-quarantine at home. You should follow the prevention steps below until a healthcare provider or local or state health department says you can return to your normal activities.   1. You should restrict activities outside your home, except for getting medical care.   2. Do not go to work, school, or public areas.   3. Avoid using public transportation, ride-sharing, or taxis.   4. Separate yourself from other people and animals in your home as much as possible.  When you are around other people (e.g., sharing a room or vehicle) you should wear a facemask.  5. Wash your hands often with soap and water for at least 20 seconds, especially after blowing your nose, coughing, or sneezing; going to the bathroom; and before eating or preparing food.  6. Cover your mouth and nose with a tissue when you cough or sneeze. Throw used tissues in a lined trash can. Immediately wash your hands with soap and water for at least 20 seconds  7. High touch surfaces include counters, tabletops, doorknobs, bathroom fixtures, toilets, phones, keyboards, tablets, and bedside tables.  8. Avoid sharing dishes, drinking glasses, cups, eating utensils, towels, or bedding with other people or pets in your home. After using these items, they should be washed thoroughly with soap and water.  You are strongly advised to seek prompt medical attention if your illness worsens or you develop new symptoms like fever or difficulty breathing.      SECONDARY DISCHARGE DIAGNOSES  Diagnosis: Benign prostate hyperplasia  Assessment and Plan of Treatment: Please continue your prostate medication and follow up with your primary care doctor.

## 2021-11-29 ENCOUNTER — TRANSCRIPTION ENCOUNTER (OUTPATIENT)
Age: 86
End: 2021-11-29

## 2021-11-29 VITALS
SYSTOLIC BLOOD PRESSURE: 114 MMHG | HEART RATE: 106 BPM | DIASTOLIC BLOOD PRESSURE: 71 MMHG | OXYGEN SATURATION: 95 % | TEMPERATURE: 97 F | RESPIRATION RATE: 19 BRPM

## 2021-11-29 LAB
ALBUMIN SERPL ELPH-MCNC: 2.9 G/DL — LOW (ref 3.5–5)
ALP SERPL-CCNC: 68 U/L — SIGNIFICANT CHANGE UP (ref 40–120)
ALT FLD-CCNC: 25 U/L DA — SIGNIFICANT CHANGE UP (ref 10–60)
AST SERPL-CCNC: 17 U/L — SIGNIFICANT CHANGE UP (ref 10–40)
BILIRUB DIRECT SERPL-MCNC: 0.1 MG/DL — SIGNIFICANT CHANGE UP (ref 0–0.3)
BILIRUB INDIRECT FLD-MCNC: 0.3 MG/DL — SIGNIFICANT CHANGE UP (ref 0.2–1)
BILIRUB SERPL-MCNC: 0.4 MG/DL — SIGNIFICANT CHANGE UP (ref 0.2–1.2)
CREAT SERPL-MCNC: 1 MG/DL — SIGNIFICANT CHANGE UP (ref 0.5–1.3)
GLUCOSE BLDC GLUCOMTR-MCNC: 101 MG/DL — HIGH (ref 70–99)
GLUCOSE BLDC GLUCOMTR-MCNC: 161 MG/DL — HIGH (ref 70–99)
GLUCOSE BLDC GLUCOMTR-MCNC: 304 MG/DL — HIGH (ref 70–99)
HCT VFR BLD CALC: 41.2 % — SIGNIFICANT CHANGE UP (ref 39–50)
HGB BLD-MCNC: 14.1 G/DL — SIGNIFICANT CHANGE UP (ref 13–17)
INR BLD: 1.24 RATIO — HIGH (ref 0.88–1.16)
MCHC RBC-ENTMCNC: 30.3 PG — SIGNIFICANT CHANGE UP (ref 27–34)
MCHC RBC-ENTMCNC: 34.2 GM/DL — SIGNIFICANT CHANGE UP (ref 32–36)
MCV RBC AUTO: 88.4 FL — SIGNIFICANT CHANGE UP (ref 80–100)
MRSA PCR RESULT.: SIGNIFICANT CHANGE UP
NRBC # BLD: 0 /100 WBCS — SIGNIFICANT CHANGE UP (ref 0–0)
PLATELET # BLD AUTO: 301 K/UL — SIGNIFICANT CHANGE UP (ref 150–400)
PROT SERPL-MCNC: 6.2 G/DL — SIGNIFICANT CHANGE UP (ref 6–8.3)
PROTHROM AB SERPL-ACNC: 14.6 SEC — HIGH (ref 10.6–13.6)
RBC # BLD: 4.66 M/UL — SIGNIFICANT CHANGE UP (ref 4.2–5.8)
RBC # FLD: 13.8 % — SIGNIFICANT CHANGE UP (ref 10.3–14.5)
S AUREUS DNA NOSE QL NAA+PROBE: SIGNIFICANT CHANGE UP
WBC # BLD: 9.27 K/UL — SIGNIFICANT CHANGE UP (ref 3.8–10.5)
WBC # FLD AUTO: 9.27 K/UL — SIGNIFICANT CHANGE UP (ref 3.8–10.5)

## 2021-11-29 PROCEDURE — 83735 ASSAY OF MAGNESIUM: CPT

## 2021-11-29 PROCEDURE — 83605 ASSAY OF LACTIC ACID: CPT

## 2021-11-29 PROCEDURE — 85730 THROMBOPLASTIN TIME PARTIAL: CPT

## 2021-11-29 PROCEDURE — 96360 HYDRATION IV INFUSION INIT: CPT

## 2021-11-29 PROCEDURE — 80053 COMPREHEN METABOLIC PANEL: CPT

## 2021-11-29 PROCEDURE — 87077 CULTURE AEROBIC IDENTIFY: CPT

## 2021-11-29 PROCEDURE — 84484 ASSAY OF TROPONIN QUANT: CPT

## 2021-11-29 PROCEDURE — 81001 URINALYSIS AUTO W/SCOPE: CPT

## 2021-11-29 PROCEDURE — 85652 RBC SED RATE AUTOMATED: CPT

## 2021-11-29 PROCEDURE — 87040 BLOOD CULTURE FOR BACTERIA: CPT

## 2021-11-29 PROCEDURE — 36415 COLL VENOUS BLD VENIPUNCTURE: CPT

## 2021-11-29 PROCEDURE — 85610 PROTHROMBIN TIME: CPT

## 2021-11-29 PROCEDURE — 87086 URINE CULTURE/COLONY COUNT: CPT

## 2021-11-29 PROCEDURE — 85025 COMPLETE CBC W/AUTO DIFF WBC: CPT

## 2021-11-29 PROCEDURE — 87640 STAPH A DNA AMP PROBE: CPT

## 2021-11-29 PROCEDURE — 71045 X-RAY EXAM CHEST 1 VIEW: CPT

## 2021-11-29 PROCEDURE — 86140 C-REACTIVE PROTEIN: CPT

## 2021-11-29 PROCEDURE — 85379 FIBRIN DEGRADATION QUANT: CPT

## 2021-11-29 PROCEDURE — 83036 HEMOGLOBIN GLYCOSYLATED A1C: CPT

## 2021-11-29 PROCEDURE — 99239 HOSP IP/OBS DSCHRG MGMT >30: CPT

## 2021-11-29 PROCEDURE — 84100 ASSAY OF PHOSPHORUS: CPT

## 2021-11-29 PROCEDURE — 84145 PROCALCITONIN (PCT): CPT

## 2021-11-29 PROCEDURE — 82962 GLUCOSE BLOOD TEST: CPT

## 2021-11-29 PROCEDURE — 87635 SARS-COV-2 COVID-19 AMP PRB: CPT

## 2021-11-29 PROCEDURE — 86769 SARS-COV-2 COVID-19 ANTIBODY: CPT

## 2021-11-29 PROCEDURE — 87641 MR-STAPH DNA AMP PROBE: CPT

## 2021-11-29 PROCEDURE — 82565 ASSAY OF CREATININE: CPT

## 2021-11-29 PROCEDURE — 85027 COMPLETE CBC AUTOMATED: CPT

## 2021-11-29 PROCEDURE — 82728 ASSAY OF FERRITIN: CPT

## 2021-11-29 PROCEDURE — 93005 ELECTROCARDIOGRAM TRACING: CPT

## 2021-11-29 PROCEDURE — 99285 EMERGENCY DEPT VISIT HI MDM: CPT

## 2021-11-29 PROCEDURE — 80076 HEPATIC FUNCTION PANEL: CPT

## 2021-11-29 PROCEDURE — 83615 LACTATE (LD) (LDH) ENZYME: CPT

## 2021-11-29 RX ORDER — DEXAMETHASONE 0.5 MG/5ML
1 ELIXIR ORAL
Qty: 6 | Refills: 0
Start: 2021-11-29 | End: 2021-12-04

## 2021-11-29 RX ADMIN — Medication 6 MILLIGRAM(S): at 05:23

## 2021-11-29 RX ADMIN — Medication 81 MILLIGRAM(S): at 12:14

## 2021-11-29 RX ADMIN — Medication 4: at 12:13

## 2021-11-29 RX ADMIN — Medication 1: at 08:24

## 2021-11-29 RX ADMIN — REMDESIVIR 500 MILLIGRAM(S): 5 INJECTION INTRAVENOUS at 05:23

## 2021-11-29 RX ADMIN — ENOXAPARIN SODIUM 40 MILLIGRAM(S): 100 INJECTION SUBCUTANEOUS at 12:14

## 2021-11-29 NOTE — DISCHARGE NOTE NURSING/CASE MANAGEMENT/SOCIAL WORK - PATIENT PORTAL LINK FT
You can access the FollowMyHealth Patient Portal offered by [facility] by registering at the following website: [email or web address]. By joining [facility]’s FollowMyHealth portal, you will also be able to view your health information using other applications (apps) compatible with our system.

## 2021-11-30 ENCOUNTER — TRANSCRIPTION ENCOUNTER (OUTPATIENT)
Age: 86
End: 2021-11-30

## 2021-12-01 LAB
CULTURE RESULTS: SIGNIFICANT CHANGE UP
CULTURE RESULTS: SIGNIFICANT CHANGE UP
SPECIMEN SOURCE: SIGNIFICANT CHANGE UP
SPECIMEN SOURCE: SIGNIFICANT CHANGE UP

## 2022-01-18 ENCOUNTER — INPATIENT (INPATIENT)
Facility: HOSPITAL | Age: 87
LOS: 2 days | Discharge: HOME | DRG: 871 | End: 2022-01-21
Attending: INTERNAL MEDICINE | Admitting: INTERNAL MEDICINE
Payer: COMMERCIAL

## 2022-01-18 VITALS
TEMPERATURE: 98 F | HEART RATE: 98 BPM | HEIGHT: 62 IN | WEIGHT: 139.99 LBS | OXYGEN SATURATION: 95 % | RESPIRATION RATE: 18 BRPM | DIASTOLIC BLOOD PRESSURE: 74 MMHG | SYSTOLIC BLOOD PRESSURE: 118 MMHG

## 2022-01-18 DIAGNOSIS — N40.0 BENIGN PROSTATIC HYPERPLASIA WITHOUT LOWER URINARY TRACT SYMPTOMS: ICD-10-CM

## 2022-01-18 DIAGNOSIS — N39.0 URINARY TRACT INFECTION, SITE NOT SPECIFIED: ICD-10-CM

## 2022-01-18 DIAGNOSIS — R33.9 RETENTION OF URINE, UNSPECIFIED: ICD-10-CM

## 2022-01-18 DIAGNOSIS — U07.1 COVID-19: ICD-10-CM

## 2022-01-18 DIAGNOSIS — Z78.9 OTHER SPECIFIED HEALTH STATUS: Chronic | ICD-10-CM

## 2022-01-18 DIAGNOSIS — Z29.9 ENCOUNTER FOR PROPHYLACTIC MEASURES, UNSPECIFIED: ICD-10-CM

## 2022-01-18 DIAGNOSIS — N17.9 ACUTE KIDNEY FAILURE, UNSPECIFIED: ICD-10-CM

## 2022-01-18 LAB
ALBUMIN SERPL ELPH-MCNC: 3.8 G/DL — SIGNIFICANT CHANGE UP (ref 3.5–5)
ALP SERPL-CCNC: 121 U/L — HIGH (ref 40–120)
ALT FLD-CCNC: 37 U/L DA — SIGNIFICANT CHANGE UP (ref 10–60)
ANION GAP SERPL CALC-SCNC: 10 MMOL/L — SIGNIFICANT CHANGE UP (ref 5–17)
APPEARANCE UR: CLEAR — SIGNIFICANT CHANGE UP
AST SERPL-CCNC: 22 U/L — SIGNIFICANT CHANGE UP (ref 10–40)
BACTERIA # UR AUTO: ABNORMAL /HPF
BASOPHILS # BLD AUTO: 0.06 K/UL — SIGNIFICANT CHANGE UP (ref 0–0.2)
BASOPHILS NFR BLD AUTO: 0.2 % — SIGNIFICANT CHANGE UP (ref 0–2)
BILIRUB SERPL-MCNC: 1.5 MG/DL — HIGH (ref 0.2–1.2)
BILIRUB UR-MCNC: NEGATIVE — SIGNIFICANT CHANGE UP
BUN SERPL-MCNC: 17 MG/DL — SIGNIFICANT CHANGE UP (ref 7–18)
CALCIUM SERPL-MCNC: 9.9 MG/DL — SIGNIFICANT CHANGE UP (ref 8.4–10.5)
CHLORIDE SERPL-SCNC: 98 MMOL/L — SIGNIFICANT CHANGE UP (ref 96–108)
CO2 SERPL-SCNC: 26 MMOL/L — SIGNIFICANT CHANGE UP (ref 22–31)
COLOR SPEC: YELLOW — SIGNIFICANT CHANGE UP
COMMENT - URINE: SIGNIFICANT CHANGE UP
CREAT ?TM UR-MCNC: 112 MG/DL — SIGNIFICANT CHANGE UP
CREAT SERPL-MCNC: 1.48 MG/DL — HIGH (ref 0.5–1.3)
DIFF PNL FLD: ABNORMAL
EOSINOPHIL # BLD AUTO: 0.02 K/UL — SIGNIFICANT CHANGE UP (ref 0–0.5)
EOSINOPHIL NFR BLD AUTO: 0.1 % — SIGNIFICANT CHANGE UP (ref 0–6)
EPI CELLS # UR: ABNORMAL /HPF
GLUCOSE SERPL-MCNC: 361 MG/DL — HIGH (ref 70–99)
GLUCOSE UR QL: 1000 MG/DL
HCT VFR BLD CALC: 47.7 % — SIGNIFICANT CHANGE UP (ref 39–50)
HGB BLD-MCNC: 15.8 G/DL — SIGNIFICANT CHANGE UP (ref 13–17)
IMM GRANULOCYTES NFR BLD AUTO: 1 % — SIGNIFICANT CHANGE UP (ref 0–1.5)
KETONES UR-MCNC: ABNORMAL
LEUKOCYTE ESTERASE UR-ACNC: ABNORMAL
LYMPHOCYTES # BLD AUTO: 0.64 K/UL — LOW (ref 1–3.3)
LYMPHOCYTES # BLD AUTO: 2.3 % — LOW (ref 13–44)
MCHC RBC-ENTMCNC: 30.2 PG — SIGNIFICANT CHANGE UP (ref 27–34)
MCHC RBC-ENTMCNC: 33.1 GM/DL — SIGNIFICANT CHANGE UP (ref 32–36)
MCV RBC AUTO: 91.2 FL — SIGNIFICANT CHANGE UP (ref 80–100)
MONOCYTES # BLD AUTO: 0.82 K/UL — SIGNIFICANT CHANGE UP (ref 0–0.9)
MONOCYTES NFR BLD AUTO: 2.9 % — SIGNIFICANT CHANGE UP (ref 2–14)
NEUTROPHILS # BLD AUTO: 26.02 K/UL — HIGH (ref 1.8–7.4)
NEUTROPHILS NFR BLD AUTO: 93.5 % — HIGH (ref 43–77)
NITRITE UR-MCNC: NEGATIVE — SIGNIFICANT CHANGE UP
NRBC # BLD: 0 /100 WBCS — SIGNIFICANT CHANGE UP (ref 0–0)
PH UR: 6 — SIGNIFICANT CHANGE UP (ref 5–8)
PLATELET # BLD AUTO: 331 K/UL — SIGNIFICANT CHANGE UP (ref 150–400)
POTASSIUM SERPL-MCNC: 4.7 MMOL/L — SIGNIFICANT CHANGE UP (ref 3.5–5.3)
POTASSIUM SERPL-SCNC: 4.7 MMOL/L — SIGNIFICANT CHANGE UP (ref 3.5–5.3)
POTASSIUM UR-SCNC: 102 MMOL/L — SIGNIFICANT CHANGE UP
PROT SERPL-MCNC: 7.6 G/DL — SIGNIFICANT CHANGE UP (ref 6–8.3)
PROT UR-MCNC: 30 MG/DL
RBC # BLD: 5.23 M/UL — SIGNIFICANT CHANGE UP (ref 4.2–5.8)
RBC # FLD: 15.6 % — HIGH (ref 10.3–14.5)
RBC CASTS # UR COMP ASSIST: ABNORMAL /HPF (ref 0–2)
SARS-COV-2 RNA SPEC QL NAA+PROBE: DETECTED
SODIUM SERPL-SCNC: 134 MMOL/L — LOW (ref 135–145)
SODIUM UR-SCNC: 78 MMOL/L — SIGNIFICANT CHANGE UP
SP GR SPEC: 1.02 — SIGNIFICANT CHANGE UP (ref 1.01–1.02)
UROBILINOGEN FLD QL: NEGATIVE — SIGNIFICANT CHANGE UP
WBC # BLD: 27.73 K/UL — HIGH (ref 3.8–10.5)
WBC # FLD AUTO: 27.73 K/UL — HIGH (ref 3.8–10.5)
WBC UR QL: ABNORMAL /HPF (ref 0–5)

## 2022-01-18 PROCEDURE — 99221 1ST HOSP IP/OBS SF/LOW 40: CPT | Mod: GC

## 2022-01-18 PROCEDURE — 99285 EMERGENCY DEPT VISIT HI MDM: CPT

## 2022-01-18 PROCEDURE — 71045 X-RAY EXAM CHEST 1 VIEW: CPT | Mod: 26

## 2022-01-18 RX ORDER — SODIUM CHLORIDE 9 MG/ML
1000 INJECTION INTRAMUSCULAR; INTRAVENOUS; SUBCUTANEOUS
Refills: 0 | Status: DISCONTINUED | OUTPATIENT
Start: 2022-01-18 | End: 2022-01-21

## 2022-01-18 RX ORDER — ASPIRIN/CALCIUM CARB/MAGNESIUM 324 MG
1 TABLET ORAL
Qty: 0 | Refills: 0 | DISCHARGE

## 2022-01-18 RX ORDER — PANTOPRAZOLE SODIUM 20 MG/1
40 TABLET, DELAYED RELEASE ORAL
Refills: 0 | Status: DISCONTINUED | OUTPATIENT
Start: 2022-01-18 | End: 2022-01-21

## 2022-01-18 RX ORDER — SODIUM CHLORIDE 9 MG/ML
500 INJECTION INTRAMUSCULAR; INTRAVENOUS; SUBCUTANEOUS ONCE
Refills: 0 | Status: COMPLETED | OUTPATIENT
Start: 2022-01-18 | End: 2022-01-18

## 2022-01-18 RX ORDER — CEFTRIAXONE 500 MG/1
1000 INJECTION, POWDER, FOR SOLUTION INTRAMUSCULAR; INTRAVENOUS EVERY 24 HOURS
Refills: 0 | Status: COMPLETED | OUTPATIENT
Start: 2022-01-18 | End: 2022-01-21

## 2022-01-18 RX ORDER — HEPARIN SODIUM 5000 [USP'U]/ML
5000 INJECTION INTRAVENOUS; SUBCUTANEOUS EVERY 12 HOURS
Refills: 0 | Status: DISCONTINUED | OUTPATIENT
Start: 2022-01-18 | End: 2022-01-21

## 2022-01-18 RX ORDER — CEFTRIAXONE 500 MG/1
1000 INJECTION, POWDER, FOR SOLUTION INTRAMUSCULAR; INTRAVENOUS ONCE
Refills: 0 | Status: COMPLETED | OUTPATIENT
Start: 2022-01-18 | End: 2022-01-18

## 2022-01-18 RX ORDER — TAMSULOSIN HYDROCHLORIDE 0.4 MG/1
0.4 CAPSULE ORAL AT BEDTIME
Refills: 0 | Status: DISCONTINUED | OUTPATIENT
Start: 2022-01-18 | End: 2022-01-21

## 2022-01-18 RX ORDER — INSULIN LISPRO 100/ML
VIAL (ML) SUBCUTANEOUS
Refills: 0 | Status: DISCONTINUED | OUTPATIENT
Start: 2022-01-18 | End: 2022-01-21

## 2022-01-18 RX ADMIN — SODIUM CHLORIDE 500 MILLILITER(S): 9 INJECTION INTRAMUSCULAR; INTRAVENOUS; SUBCUTANEOUS at 13:12

## 2022-01-18 RX ADMIN — CEFTRIAXONE 1000 MILLIGRAM(S): 500 INJECTION, POWDER, FOR SOLUTION INTRAMUSCULAR; INTRAVENOUS at 16:41

## 2022-01-18 RX ADMIN — SODIUM CHLORIDE 500 MILLILITER(S): 9 INJECTION INTRAMUSCULAR; INTRAVENOUS; SUBCUTANEOUS at 14:12

## 2022-01-18 RX ADMIN — SODIUM CHLORIDE 75 MILLILITER(S): 9 INJECTION INTRAMUSCULAR; INTRAVENOUS; SUBCUTANEOUS at 19:49

## 2022-01-18 RX ADMIN — CEFTRIAXONE 100 MILLIGRAM(S): 500 INJECTION, POWDER, FOR SOLUTION INTRAMUSCULAR; INTRAVENOUS at 16:11

## 2022-01-18 RX ADMIN — TAMSULOSIN HYDROCHLORIDE 0.4 MILLIGRAM(S): 0.4 CAPSULE ORAL at 22:42

## 2022-01-18 NOTE — H&P ADULT - PROBLEM SELECTOR PLAN 3
- noted to have SCr of 1.48, prior SCr in November was WNL   - f/u urine lytes  - IVF NS@ 75cc/hr  - f/u renal US

## 2022-01-18 NOTE — H&P ADULT - PROBLEM SELECTOR PLAN 4
- patient noted to have COVID infection prior to admission, vaccinated   - saturating well on room air  - cxr showing Improved bilateral interstitial infiltrates compared to cxr in 11/21  - will hold off decadron and remdesivir   - monitor oxygenation status   - isolation precautions

## 2022-01-18 NOTE — H&P ADULT - HISTORY OF PRESENT ILLNESS
Patient is a Occitan speaking 89 yo male with hx of BPH, recent covid infection 2 months ago bib family member for no urination for 2 days. Patient is complaining of right sided flank pain, non radiating, no aggravating factors. Pain is alleviated with "ointment" as per the patient. Pain is 6/10 in severity, not constant and non radiating. Flank pain has been present for more than one month. Patient also complains of dysuria that has been going on for the last few days. Patient denies any urinary urgency or increased frequency. Denies hematuria, fevers or chills. Patient is vaccinated for COVID, denies any cough or sick contacts at home. Denies any chest pain or shortness of breath.

## 2022-01-18 NOTE — H&P ADULT - NSHPREVIEWOFSYSTEMS_GEN_ALL_CORE
CONSTITUTIONAL: + decreased appetite, generalized weakness.  No fever, weight loss, or fatigue  EYES: No eye pain, visual disturbances, or discharge  ENT:  No difficulty hearing, tinnitus, vertigo; No sinus or throat pain  NECK: No pain or stiffness  RESPIRATORY: No cough, wheezing, chills or hemoptysis; No Shortness of Breath  CARDIOVASCULAR: No chest pain, palpitations, passing out, dizziness, or leg swelling  GASTROINTESTINAL: No abdominal or epigastric pain. No nausea, vomiting, or hematemesis; No diarrhea or constipation. No melena or hematochezia.  GENITOURINARY: Has dysuria, No frequency, hematuria, or incontinence. Right sided flank pain   NEUROLOGICAL: No headaches, memory loss, loss of strength, numbness, or tremors  SKIN: No skin complaints   LYMPH Nodes: No enlarged glands  ENDOCRINE: No heat or cold intolerance; No hair loss  MUSCULOSKELETAL: No joint pain or swelling; No muscle, back, No extremity pain  PSYCHIATRIC: No depression, anxiety, mood swings, or difficulty sleeping  HEME/LYMPH: No easy bruising, or bleeding gums  ALLERGY AND IMMUNOLOGIC: No hives or eczema

## 2022-01-18 NOTE — ED PROVIDER NOTE - OBJECTIVE STATEMENT
89yo M pmhx bph, recent covid infection 2 months ago bib family member for no urination for 2 days. family member not seen in ER after triage, unreachable. pt only oriented to place and poor historian. multiple attempts made to call daughter with no success.   pt stated he had recent covid and has been feeling body aches recently. denies urinary sxs. however nurse noticed dribbling urine when pt when to urinate.   Approx 300cc when ramirez placed.

## 2022-01-18 NOTE — H&P ADULT - ASSESSMENT
Patient is a 89yo Tamazight speaking female with hx of BPH, presenting with urinary retention. Patient upon evaluation found to be afebrile, tachycardic to 105bpm, with bp of 135/69. Patient found to have leukocytosis of 27, with UTI. Patient admitted for sepsis 2/2 UTI.

## 2022-01-18 NOTE — ED PROVIDER NOTE - PROGRESS NOTE DETAILS
labs noted, wbc 20s  ua wbc 20s  uti 2/2 retention  ongoing drainage in ramirez, ctx given. cxr showing no acute infiltrates  admitted for gerald, uti 2/2 retention

## 2022-01-18 NOTE — H&P ADULT - ATTENDING COMMENTS
89 year old M, Tajik speaking, w/ PMHx of BPH presenting for chief complaint of urinary retention. Patient says he has had increased frequency and difficulty voiding that is new for him. Endorses pain on left flank area that has been constant for a month, 5/10 day. Pain does not radiate. Uses an ointment at home for flank pain. Denies any fever, chills nausea/vomiting, gross hematuria. Lives alone at home and is independent.    Vital Signs Last 24 Hrs  T(C): 37.2 (19 Jan 2022 06:07), Max: 38.8 (19 Jan 2022 03:30)  T(F): 99 (19 Jan 2022 06:07), Max: 101.9 (19 Jan 2022 03:30)  HR: 106 (19 Jan 2022 06:07) (98 - 123)  BP: 95/63 (19 Jan 2022 05:10) (95/63 - 135/69)  BP(mean): --  RR: 16 (19 Jan 2022 06:07) (16 - 20)  SpO2: 95% (19 Jan 2022 06:07) (92% - 99%)    On exam  patient is in NAD, lungs CTAB, heart RRR no m/g/r, no CVA tenderness  Labs notable for WBC 27 and Cr 1.48, UA +    A/P  #SIRS 2/2 UTI (tachycardic and leukocytosis)  #Urinary retention s/p ramirez catheter   #KHOA, likely pre-renal, unknown baseline  #Hyponatremia, likely hypovolemic in the setting of dehydration  #BPH  #Covid infection, asymptomatic, vaccinated  #Leukocytosis, in the setting of UTI  - start ceftriaxone  - f/u urine cx, blood cx  - IVF   - CBC and CMP daily, monitor Cr and WBC  - start flomax  - send urine lytes  - renal ultrasound  - consider voiding trial in 2-3 days after flomax  - will need urology f/u outpatient  - continue to monitor O2 saturation

## 2022-01-18 NOTE — H&P ADULT - PROBLEM SELECTOR PLAN 2
- patient presenting with urinary retention  - s/p ramirez in ED draining 200cc clear urine  - no suprapubic tenderness noted   - f/u renal U/S to r/o any obstruction  - continue with flomax   - patient may need urology outpatient follow up upon discharge

## 2022-01-18 NOTE — ED PROVIDER NOTE - CARE PLAN
Principal Discharge DX:	KHOA (acute kidney injury)  Secondary Diagnosis:	Acute UTI  Secondary Diagnosis:	Retention, urine   1

## 2022-01-18 NOTE — ED PROVIDER NOTE - PHYSICAL EXAMINATION
Sohrawardy:   VS reviewed  NAD, not ill-appearing  aao to person, hazy idea of time (likely baseline)  rrr, s1s2, no jvd, no pitting edema  normal resp effort  lungs ctab, no w/r/r  abdomen soft, nd/nt   no cva tenderness b/l  cn intact, strength 5/5, sensation intact, no neuro deficits

## 2022-01-18 NOTE — ED PROVIDER NOTE - CLINICAL SUMMARY MEDICAL DECISION MAKING FREE TEXT BOX
89yo M pmhx bph p/w urinary retention  pt elderly, poor historian, family member unreachable  labs, ua, ramirez, reassess

## 2022-01-18 NOTE — ED ADULT NURSE NOTE - OBJECTIVE STATEMENT
Pt came to the ED with c/o urinary retention with pain to bilateral flank area.  Denies any fever at home.  Pt states he feel slight pain and burning when urinating.  Pt states he is able to urinate but with little output.  Denies any hematuria.  No tenderness to abdominal or flank area noted.  Vital signs stable.

## 2022-01-18 NOTE — ED PROVIDER NOTE - NS ED ROS FT
(+) urinary retention  (-) fevers, chills  (-) dizziness, lightheadedness, vision changes  (-) neck pain, stiffness  (-) cp, palpitations  (-) sob, cough, hemoptysis  (-) abd pain, n/v/d/c   (-) weakness, paresthesias

## 2022-01-18 NOTE — ED ADULT TRIAGE NOTE - WEIGHT METHOD
I have personally provided the amount of critical care time documented below concurrently with the resident/fellow.  This time excludes time spent on separate procedures and time spent teaching. I have reviewed the resident’s / fellow’s documentation and I agree with the history, exam, and assessment and plan of care. actual

## 2022-01-18 NOTE — H&P ADULT - PROBLEM SELECTOR PLAN 1
- patient noted to meet Sepsis criteria (heart rate >90bpm, WBC 27K, and source of infection UA)  - complaining of flank pain and dysuria   - will send blood cultures  - IVF NS@ 75cc/hr  - Lactate in AM (prior lactate 1.9)  - s/p 1 dose of rocephin in ED  - Continue with rocephin   - f/u urine cultures

## 2022-01-18 NOTE — H&P ADULT - NSHPSOCIALHISTORY_GEN_ALL_CORE
Patient states he loves at home alone. Can perform all ADL's on his own. Denies any illicit drug use, tobacco use or ETOH use.

## 2022-01-19 LAB
ALBUMIN SERPL ELPH-MCNC: 2.5 G/DL — LOW (ref 3.5–5)
ALP SERPL-CCNC: 88 U/L — SIGNIFICANT CHANGE UP (ref 40–120)
ALT FLD-CCNC: 25 U/L DA — SIGNIFICANT CHANGE UP (ref 10–60)
ANION GAP SERPL CALC-SCNC: 7 MMOL/L — SIGNIFICANT CHANGE UP (ref 5–17)
AST SERPL-CCNC: 19 U/L — SIGNIFICANT CHANGE UP (ref 10–40)
BILIRUB SERPL-MCNC: 1.2 MG/DL — SIGNIFICANT CHANGE UP (ref 0.2–1.2)
BUN SERPL-MCNC: 16 MG/DL — SIGNIFICANT CHANGE UP (ref 7–18)
CALCIUM SERPL-MCNC: 8.9 MG/DL — SIGNIFICANT CHANGE UP (ref 8.4–10.5)
CALCIUM UR-MCNC: 5.7 MG/DL — SIGNIFICANT CHANGE UP
CHLORIDE SERPL-SCNC: 104 MMOL/L — SIGNIFICANT CHANGE UP (ref 96–108)
CO2 SERPL-SCNC: 25 MMOL/L — SIGNIFICANT CHANGE UP (ref 22–31)
CREAT SERPL-MCNC: 0.96 MG/DL — SIGNIFICANT CHANGE UP (ref 0.5–1.3)
GLUCOSE BLDC GLUCOMTR-MCNC: 188 MG/DL — HIGH (ref 70–99)
GLUCOSE BLDC GLUCOMTR-MCNC: 229 MG/DL — HIGH (ref 70–99)
GLUCOSE BLDC GLUCOMTR-MCNC: 238 MG/DL — HIGH (ref 70–99)
GLUCOSE BLDC GLUCOMTR-MCNC: 296 MG/DL — HIGH (ref 70–99)
GLUCOSE SERPL-MCNC: 233 MG/DL — HIGH (ref 70–99)
HCT VFR BLD CALC: 39.5 % — SIGNIFICANT CHANGE UP (ref 39–50)
HGB BLD-MCNC: 13.5 G/DL — SIGNIFICANT CHANGE UP (ref 13–17)
LACTATE SERPL-SCNC: 2 MMOL/L — SIGNIFICANT CHANGE UP (ref 0.7–2)
MAGNESIUM SERPL-MCNC: 1.9 MG/DL — SIGNIFICANT CHANGE UP (ref 1.6–2.6)
MCHC RBC-ENTMCNC: 30.5 PG — SIGNIFICANT CHANGE UP (ref 27–34)
MCHC RBC-ENTMCNC: 34.2 GM/DL — SIGNIFICANT CHANGE UP (ref 32–36)
MCV RBC AUTO: 89.2 FL — SIGNIFICANT CHANGE UP (ref 80–100)
NRBC # BLD: 0 /100 WBCS — SIGNIFICANT CHANGE UP (ref 0–0)
PHOSPHATE 24H UR-MCNC: 48.6 MG/DL — SIGNIFICANT CHANGE UP
PHOSPHATE SERPL-MCNC: 2.2 MG/DL — LOW (ref 2.5–4.5)
PLATELET # BLD AUTO: 202 K/UL — SIGNIFICANT CHANGE UP (ref 150–400)
POTASSIUM SERPL-MCNC: 3.7 MMOL/L — SIGNIFICANT CHANGE UP (ref 3.5–5.3)
POTASSIUM SERPL-SCNC: 3.7 MMOL/L — SIGNIFICANT CHANGE UP (ref 3.5–5.3)
PROT SERPL-MCNC: 6.1 G/DL — SIGNIFICANT CHANGE UP (ref 6–8.3)
RBC # BLD: 4.43 M/UL — SIGNIFICANT CHANGE UP (ref 4.2–5.8)
RBC # FLD: 15.9 % — HIGH (ref 10.3–14.5)
SODIUM SERPL-SCNC: 136 MMOL/L — SIGNIFICANT CHANGE UP (ref 135–145)
WBC # BLD: 18.64 K/UL — HIGH (ref 3.8–10.5)
WBC # FLD AUTO: 18.64 K/UL — HIGH (ref 3.8–10.5)

## 2022-01-19 PROCEDURE — 99233 SBSQ HOSP IP/OBS HIGH 50: CPT | Mod: GC

## 2022-01-19 RX ORDER — ACETAMINOPHEN 500 MG
650 TABLET ORAL ONCE
Refills: 0 | Status: COMPLETED | OUTPATIENT
Start: 2022-01-19 | End: 2022-01-19

## 2022-01-19 RX ORDER — SODIUM,POTASSIUM PHOSPHATES 278-250MG
1 POWDER IN PACKET (EA) ORAL ONCE
Refills: 0 | Status: COMPLETED | OUTPATIENT
Start: 2022-01-19 | End: 2022-01-19

## 2022-01-19 RX ADMIN — Medication 650 MILLIGRAM(S): at 05:25

## 2022-01-19 RX ADMIN — Medication 1 PACKET(S): at 11:00

## 2022-01-19 RX ADMIN — SODIUM CHLORIDE 75 MILLILITER(S): 9 INJECTION INTRAMUSCULAR; INTRAVENOUS; SUBCUTANEOUS at 05:41

## 2022-01-19 RX ADMIN — Medication 3: at 17:08

## 2022-01-19 RX ADMIN — Medication 650 MILLIGRAM(S): at 04:25

## 2022-01-19 RX ADMIN — Medication 2: at 08:01

## 2022-01-19 RX ADMIN — SODIUM CHLORIDE 75 MILLILITER(S): 9 INJECTION INTRAMUSCULAR; INTRAVENOUS; SUBCUTANEOUS at 22:48

## 2022-01-19 RX ADMIN — HEPARIN SODIUM 5000 UNIT(S): 5000 INJECTION INTRAVENOUS; SUBCUTANEOUS at 17:08

## 2022-01-19 RX ADMIN — Medication 2: at 11:27

## 2022-01-19 RX ADMIN — HEPARIN SODIUM 5000 UNIT(S): 5000 INJECTION INTRAVENOUS; SUBCUTANEOUS at 05:40

## 2022-01-19 RX ADMIN — TAMSULOSIN HYDROCHLORIDE 0.4 MILLIGRAM(S): 0.4 CAPSULE ORAL at 22:48

## 2022-01-19 RX ADMIN — SODIUM CHLORIDE 75 MILLILITER(S): 9 INJECTION INTRAMUSCULAR; INTRAVENOUS; SUBCUTANEOUS at 11:27

## 2022-01-19 RX ADMIN — PANTOPRAZOLE SODIUM 40 MILLIGRAM(S): 20 TABLET, DELAYED RELEASE ORAL at 05:40

## 2022-01-19 RX ADMIN — CEFTRIAXONE 100 MILLIGRAM(S): 500 INJECTION, POWDER, FOR SOLUTION INTRAMUSCULAR; INTRAVENOUS at 05:41

## 2022-01-19 RX ADMIN — SODIUM CHLORIDE 75 MILLILITER(S): 9 INJECTION INTRAMUSCULAR; INTRAVENOUS; SUBCUTANEOUS at 04:25

## 2022-01-19 NOTE — PROGRESS NOTE ADULT - PROBLEM SELECTOR PLAN 3
- noted to have SCr of 1.48, prior SCr in November was WNL   - f/u urine lytes  - IVF NS@ 75cc/hr  - f/u renal US - noted to have SCr of 1.48, prior SCr in November was WNL '   likely obstructive (h/o BPH, BUN/Cr < 15) vs mixed obstructive + pre-renal (dehydration 2/2 covid and poor PO intake)   SCr 1.48 > 0.98, resolved with IV hydration and catheterization

## 2022-01-19 NOTE — PROGRESS NOTE ADULT - SUBJECTIVE AND OBJECTIVE BOX
PGY-1 Progress Note discussed with attending    CHIEF COMPLAINT & BRIEF HOSPITAL COURSE:  Patient is a Maori speaking 89 yo male with hx of BPH, recent covid infection 2 months ago bib family member for no urination for 2 days. Patient is complaining of right sided flank pain, non radiating, no aggravating factors. Pain is alleviated with "ointment" as per the patient. Pain is 6/10 in severity, not constant and non radiating. Flank pain has been present for more than one month. Patient also complains of dysuria that has been going on for the last few days. Patient denies any urinary urgency or increased frequency. Denies hematuria, fevers or chills. Patient is vaccinated for COVID, denies any cough or sick contacts at home. Denies any chest pain or shortness of breath.       INTERVAL HPI/OVERNIGHT EVENTS:   Patient was seen and examined at bedside. No acute events overnight. Patient C/O low appetite, but denies flank/abdominal pain, fever, nausea or vomiting.       REVIEW OF SYSTEMS:  CONSTITUTIONAL: + decreased appetite, generalized weakness.  RESPIRATORY: No cough, wheezing, chills or hemoptysis; No shortness of breath  CARDIOVASCULAR: No chest pain, palpitations, dizziness, or leg swelling  GASTROINTESTINAL: No abdominal pain. No nausea, vomiting, or hematemesis; No diarrhea or constipation. No melena or hematochezia.  GENITOURINARY: + dysuria, NO hematuria, urinary frequency  NEUROLOGICAL: No headaches, memory loss, loss of strength, numbness, or tremors  SKIN: No itching, burning, rashes, or lesions     MEDICATIONS  (STANDING):  cefTRIAXone   IVPB 1000 milliGRAM(s) IV Intermittent every 24 hours  heparin   Injectable 5000 Unit(s) SubCutaneous every 12 hours  insulin lispro (ADMELOG) corrective regimen sliding scale   SubCutaneous three times a day before meals  pantoprazole    Tablet 40 milliGRAM(s) Oral before breakfast  sodium chloride 0.9%. 1000 milliLiter(s) (75 mL/Hr) IV Continuous <Continuous>  tamsulosin 0.4 milliGRAM(s) Oral at bedtime    MEDICATIONS  (PRN):      Vital Signs Last 24 Hrs  T(C): 37.2 (19 Jan 2022 06:07), Max: 38.8 (19 Jan 2022 03:30)  T(F): 99 (19 Jan 2022 06:07), Max: 101.9 (19 Jan 2022 03:30)  HR: 106 (19 Jan 2022 06:07) (106 - 123)  BP: 95/63 (19 Jan 2022 05:10) (95/63 - 135/69)  BP(mean): --  RR: 16 (19 Jan 2022 06:07) (16 - 20)  SpO2: 95% (19 Jan 2022 06:07) (92% - 99%)    PHYSICAL EXAMINATION:  GENERAL: NAD, well built  HEAD:  Atraumatic, Normocephalic  EYES:  conjunctiva and sclera clear  NECK: Supple, No JVD, Normal thyroid  CHEST/LUNG: Clear to auscultation. Clear to percussion bilaterally; No rales, rhonchi, wheezing, or rubs  HEART: Regular rate and rhythm; No murmurs, rubs, or gallops  ABDOMEN: Soft, Nontender, Nondistended; Bowel sounds present, no pain or masses on palpation  NERVOUS SYSTEM:  Alert & Oriented X3  : voiding well  EXTREMITIES:  2+ Peripheral Pulses, No clubbing, cyanosis, or edema  SKIN: warm dry                          13.5   18.64 )-----------( 202      ( 19 Jan 2022 09:06 )             39.5     01-19    136  |  104  |  16  ----------------------------<  233<H>  3.7   |  25  |  0.96    Ca    8.9      19 Jan 2022 09:06  Phos  2.2     01-19  Mg     1.9     01-19    TPro  6.1  /  Alb  2.5<L>  /  TBili  1.2  /  DBili  x   /  AST  19  /  ALT  25  /  AlkPhos  88  01-19    LIVER FUNCTIONS - ( 19 Jan 2022 09:06 )  Alb: 2.5 g/dL / Pro: 6.1 g/dL / ALK PHOS: 88 U/L / ALT: 25 U/L DA / AST: 19 U/L / GGT: x                   I&O's Summary    18 Jan 2022 07:01  -  19 Jan 2022 07:00  --------------------------------------------------------  IN: 0 mL / OUT: 200 mL / NET: -200 mL            CAPILLARY BLOOD GLUCOSE      RADIOLOGY & ADDITIONAL TESTS:                       PGY-1 Progress Note discussed with attending    CHIEF COMPLAINT & BRIEF HOSPITAL COURSE:  Patient is a 89yo Albanian speaking male with hx of BPH, presenting with urinary retention. Patient upon evaluation found to be afebrile, tachycardic to 105bpm, with bp of 135/69. Patient found to have leukocytosis of 27, with UTI. Patient admitted for sepsis 2/2 UTI.       INTERVAL HPI/OVERNIGHT EVENTS:   Patient was seen and examined at bedside. No acute events overnight. Patient C/O low appetite, but denies flank/abdominal pain, fever, nausea or vomiting.       REVIEW OF SYSTEMS:  CONSTITUTIONAL: + decreased appetite, generalized weakness.  RESPIRATORY: No cough, wheezing, chills or hemoptysis; No shortness of breath  CARDIOVASCULAR: No chest pain, palpitations, dizziness, or leg swelling  GASTROINTESTINAL: No abdominal pain. No nausea, vomiting, or hematemesis; No diarrhea or constipation. No melena or hematochezia.  GENITOURINARY: + dysuria, NO hematuria, urinary frequency  NEUROLOGICAL: No headaches, memory loss, loss of strength, numbness, or tremors  SKIN: No itching, burning, rashes, or lesions     MEDICATIONS  (STANDING):  cefTRIAXone   IVPB 1000 milliGRAM(s) IV Intermittent every 24 hours  heparin   Injectable 5000 Unit(s) SubCutaneous every 12 hours  insulin lispro (ADMELOG) corrective regimen sliding scale   SubCutaneous three times a day before meals  pantoprazole    Tablet 40 milliGRAM(s) Oral before breakfast  sodium chloride 0.9%. 1000 milliLiter(s) (75 mL/Hr) IV Continuous <Continuous>  tamsulosin 0.4 milliGRAM(s) Oral at bedtime    MEDICATIONS  (PRN):      Vital Signs Last 24 Hrs  T(C): 37.2 (19 Jan 2022 06:07), Max: 38.8 (19 Jan 2022 03:30)  T(F): 99 (19 Jan 2022 06:07), Max: 101.9 (19 Jan 2022 03:30)  HR: 106 (19 Jan 2022 06:07) (106 - 123)  BP: 95/63 (19 Jan 2022 05:10) (95/63 - 135/69)  BP(mean): --  RR: 16 (19 Jan 2022 06:07) (16 - 20)  SpO2: 95% (19 Jan 2022 06:07) (92% - 99%)    PHYSICAL EXAMINATION:  GENERAL: NAD, well built  HEAD:  Atraumatic, Normocephalic  EYES:  conjunctiva and sclera clear  NECK: Supple, No JVD, Normal thyroid  CHEST/LUNG: Clear to auscultation. Clear to percussion bilaterally; No rales, rhonchi, wheezing, or rubs  HEART: Regular rate and rhythm; No murmurs, rubs, or gallops  ABDOMEN: Soft, Nontender, Nondistended; Bowel sounds present, no pain or masses on palpation  NERVOUS SYSTEM:  Alert & Oriented X3  : voiding well, clear yellow urine + ramirez cath  EXTREMITIES:  2+ Peripheral Pulses, No clubbing, cyanosis, or edema  SKIN: warm dry                          13.5   18.64 )-----------( 202      ( 19 Jan 2022 09:06 )             39.5     01-19    136  |  104  |  16  ----------------------------<  233<H>  3.7   |  25  |  0.96    Ca    8.9      19 Jan 2022 09:06  Phos  2.2     01-19  Mg     1.9     01-19    TPro  6.1  /  Alb  2.5<L>  /  TBili  1.2  /  DBili  x   /  AST  19  /  ALT  25  /  AlkPhos  88  01-19    LIVER FUNCTIONS - ( 19 Jan 2022 09:06 )  Alb: 2.5 g/dL / Pro: 6.1 g/dL / ALK PHOS: 88 U/L / ALT: 25 U/L DA / AST: 19 U/L / GGT: x                   I&O's Summary    18 Jan 2022 07:01  -  19 Jan 2022 07:00  --------------------------------------------------------  IN: 0 mL / OUT: 200 mL / NET: -200 mL            CAPILLARY BLOOD GLUCOSE      RADIOLOGY & ADDITIONAL TESTS:

## 2022-01-19 NOTE — PROGRESS NOTE ADULT - PROBLEM SELECTOR PLAN 4
- patient noted to have COVID infection prior to admission, vaccinated   - saturating well on room air  - cxr showing Improved bilateral interstitial infiltrates compared to cxr in 11/21  - will hold off decadron and remdesivir   - monitor oxygenation status   - isolation precautions - patient noted to have COVID infection prior to admission, vaccinated   - saturating well on room air  - cxr showing Improved bilateral interstitial infiltrates compared to cxr in 11/21  - will hold off decadron and remdesivir   - monitor oxygenation status   - isolation precautions  pt with 2 month h/o COVID infection, will consult with infection control regarding need for isolation

## 2022-01-19 NOTE — PROGRESS NOTE ADULT - PROBLEM SELECTOR PLAN 1
- patient noted to meet Sepsis criteria (heart rate >90bpm, WBC 27K, and source of infection UA)  - complaining of flank pain and dysuria   - will send blood cultures  - IVF NS@ 75cc/hr  - Lactate in AM (prior lactate 1.9)  - s/p 1 dose of rocephin in ED  - Continue with rocephin   - f/u urine cultures - patient noted to meet Sepsis criteria (heart rate >90bpm, WBC 27K, and source of infection UA)  - complaining of flank pain and dysuria, resolved  - BCx, UCx pending  - IVF NS@ 75cc/hr  - s/p 1 dose of rocephin in ED  - Continue with rocephin

## 2022-01-19 NOTE — PROGRESS NOTE ADULT - PROBLEM SELECTOR PLAN 2
- patient presenting with urinary retention  - s/p ramirez in ED draining 200cc clear urine  - no suprapubic tenderness noted   - f/u renal U/S to r/o any obstruction  - continue with flomax   - patient may need urology outpatient follow up upon discharge - patient presenting with urinary retention  - s/p ramirez in ED draining 300cc clear urine,  - no suprapubic tenderness noted, no flank tenderness noted  - continue with flomax   - patient may need urology outpatient follow up upon discharge

## 2022-01-19 NOTE — PATIENT PROFILE ADULT - NSPROEXTENSIONSOFSELF_GEN_A_NUR
black hat  cell phone  sweater  shirt  1 pair of pants  belt  1 pair of shoes  keys and coins  brown watch

## 2022-01-20 LAB
ANION GAP SERPL CALC-SCNC: 8 MMOL/L — SIGNIFICANT CHANGE UP (ref 5–17)
BUN SERPL-MCNC: 12 MG/DL — SIGNIFICANT CHANGE UP (ref 7–18)
CALCIUM SERPL-MCNC: 8.7 MG/DL — SIGNIFICANT CHANGE UP (ref 8.4–10.5)
CHLORIDE SERPL-SCNC: 105 MMOL/L — SIGNIFICANT CHANGE UP (ref 96–108)
CO2 SERPL-SCNC: 23 MMOL/L — SIGNIFICANT CHANGE UP (ref 22–31)
CREAT SERPL-MCNC: 0.73 MG/DL — SIGNIFICANT CHANGE UP (ref 0.5–1.3)
CULTURE RESULTS: SIGNIFICANT CHANGE UP
GLUCOSE BLDC GLUCOMTR-MCNC: 137 MG/DL — HIGH (ref 70–99)
GLUCOSE BLDC GLUCOMTR-MCNC: 138 MG/DL — HIGH (ref 70–99)
GLUCOSE BLDC GLUCOMTR-MCNC: 165 MG/DL — HIGH (ref 70–99)
GLUCOSE BLDC GLUCOMTR-MCNC: 262 MG/DL — HIGH (ref 70–99)
GLUCOSE SERPL-MCNC: 141 MG/DL — HIGH (ref 70–99)
HCT VFR BLD CALC: 34.1 % — LOW (ref 39–50)
HGB BLD-MCNC: 12.1 G/DL — LOW (ref 13–17)
MCHC RBC-ENTMCNC: 30.9 PG — SIGNIFICANT CHANGE UP (ref 27–34)
MCHC RBC-ENTMCNC: 35.5 GM/DL — SIGNIFICANT CHANGE UP (ref 32–36)
MCV RBC AUTO: 87 FL — SIGNIFICANT CHANGE UP (ref 80–100)
NRBC # BLD: 0 /100 WBCS — SIGNIFICANT CHANGE UP (ref 0–0)
PLATELET # BLD AUTO: 169 K/UL — SIGNIFICANT CHANGE UP (ref 150–400)
POTASSIUM SERPL-MCNC: 3.3 MMOL/L — LOW (ref 3.5–5.3)
POTASSIUM SERPL-SCNC: 3.3 MMOL/L — LOW (ref 3.5–5.3)
RBC # BLD: 3.92 M/UL — LOW (ref 4.2–5.8)
RBC # FLD: 15.6 % — HIGH (ref 10.3–14.5)
SODIUM SERPL-SCNC: 136 MMOL/L — SIGNIFICANT CHANGE UP (ref 135–145)
SPECIMEN SOURCE: SIGNIFICANT CHANGE UP
WBC # BLD: 12.31 K/UL — HIGH (ref 3.8–10.5)
WBC # FLD AUTO: 12.31 K/UL — HIGH (ref 3.8–10.5)

## 2022-01-20 PROCEDURE — 99233 SBSQ HOSP IP/OBS HIGH 50: CPT | Mod: GC

## 2022-01-20 RX ADMIN — Medication 1: at 17:03

## 2022-01-20 RX ADMIN — TAMSULOSIN HYDROCHLORIDE 0.4 MILLIGRAM(S): 0.4 CAPSULE ORAL at 21:22

## 2022-01-20 RX ADMIN — PANTOPRAZOLE SODIUM 40 MILLIGRAM(S): 20 TABLET, DELAYED RELEASE ORAL at 05:29

## 2022-01-20 RX ADMIN — CEFTRIAXONE 100 MILLIGRAM(S): 500 INJECTION, POWDER, FOR SOLUTION INTRAMUSCULAR; INTRAVENOUS at 05:29

## 2022-01-20 RX ADMIN — SODIUM CHLORIDE 75 MILLILITER(S): 9 INJECTION INTRAMUSCULAR; INTRAVENOUS; SUBCUTANEOUS at 11:00

## 2022-01-20 RX ADMIN — SODIUM CHLORIDE 75 MILLILITER(S): 9 INJECTION INTRAMUSCULAR; INTRAVENOUS; SUBCUTANEOUS at 21:22

## 2022-01-20 RX ADMIN — Medication 3: at 11:59

## 2022-01-20 RX ADMIN — HEPARIN SODIUM 5000 UNIT(S): 5000 INJECTION INTRAVENOUS; SUBCUTANEOUS at 05:29

## 2022-01-20 RX ADMIN — HEPARIN SODIUM 5000 UNIT(S): 5000 INJECTION INTRAVENOUS; SUBCUTANEOUS at 17:02

## 2022-01-20 NOTE — CHART NOTE - NSCHARTNOTEFT_GEN_A_CORE
Attempted to call back Patients wife at [phone] to update regarding patient's clinical condition. No answer

## 2022-01-20 NOTE — PROGRESS NOTE ADULT - ASSESSMENT
89 years old Male Macedonian speaking with history of BPH presents to the hospital with sore throat for last 3 days, Spiking fever today 101. Patient was tested positive for COVID. Patient is saturating on 3L NC. Will admit the Patient for acute hypoxic respiratory failure secondary to COVID.    
89 YO Azeri speaking male with hx of BPH, presenting with urinary retention. Patient upon evaluation found to be afebrile, tachycardic to 105bpm, with bp of 135/69. Patient found to have leukocytosis of 27, with UTI. Patient admitted for sepsis 2/2 UTI.

## 2022-01-20 NOTE — PROGRESS NOTE ADULT - NUTRITIONAL ASSESSMENT
89 YO Nepali speaking male with hx of BPH, presenting with urinary retention. Patient upon evaluation found to be afebrile, tachycardic to 105bpm, with bp of 135/69. Patient found to have leukocytosis of 27, with UTI. Patient admitted for sepsis 2/2 UTI.

## 2022-01-20 NOTE — PROGRESS NOTE ADULT - SUBJECTIVE AND OBJECTIVE BOX
PGY-1 Progress Note discussed with attending    CHIEF COMPLAINT & BRIEF HOSPITAL COURSE:  Patient is a 89yo Ukrainian speaking male with hx of BPH, presenting with urinary retention. Patient upon evaluation found to be afebrile, tachycardic to 105bpm, with bp of 135/69. Patient found to have leukocytosis of 27, with UTI. Patient admitted for sepsis 2/2 UTI.       INTERVAL HPI/OVERNIGHT EVENTS:   Patient was seen and examined at bedside. No acute events overnight reported. Fever spiked yesterday evening and Tmax [id number] F, total urine out put is 1150ml in 24 hours. Patient C/O low appetite, but denies flank/abdominal pain, fever, nausea or vomiting.         REVIEW OF SYSTEMS:  CONSTITUTIONAL: + decreased appetite, generalized weakness.  RESPIRATORY: No cough, wheezing, chills or hemoptysis; No shortness of breath  CARDIOVASCULAR: No chest pain, palpitations, dizziness, or leg swelling  GASTROINTESTINAL: No abdominal pain. No nausea, vomiting, or hematemesis; No diarrhea or constipation. No melena or hematochezia.  GENITOURINARY: + dysuria, No hematuria, urinary frequency  NEUROLOGICAL: No headaches, memory loss, loss of strength, numbness, or tremors  SKIN: No itching, burning, rashes, or lesions     MEDICATIONS  (STANDING):  cefTRIAXone   IVPB 1000 milliGRAM(s) IV Intermittent every 24 hours  heparin   Injectable 5000 Unit(s) SubCutaneous every 12 hours  insulin lispro (ADMELOG) corrective regimen sliding scale   SubCutaneous three times a day before meals  pantoprazole    Tablet 40 milliGRAM(s) Oral before breakfast  sodium chloride 0.9%. 1000 milliLiter(s) (75 mL/Hr) IV Continuous <Continuous>  tamsulosin 0.4 milliGRAM(s) Oral at bedtime    MEDICATIONS  (PRN):      Vital Signs Last 24 Hrs  T(C): 36.8 (20 Jan 2022 04:35), Max: 37.2 (19 Jan 2022 21:09)  T(F): 98.2 (20 Jan 2022 04:35), Max: 98.9 (19 Jan 2022 21:09)  HR: 99 (20 Jan 2022 04:35) (99 - 105)  BP: 112/62 (20 Jan 2022 04:35) (105/63 - 112/62)  BP(mean): --  RR: 16 (20 Jan 2022 04:35) (16 - 17)  SpO2: 99% (20 Jan 2022 04:35) (95% - 99%)    PHYSICAL EXAMINATION:  GENERAL: NAD, well built  HEAD:  Atraumatic, Normocephalic  EYES:  conjunctiva and sclera clear  NECK: Supple, No JVD, Normal thyroid  CHEST/LUNG: Clear to auscultation. Clear to percussion bilaterally; No rales, rhonchi, wheezing, or rubs  HEART: Regular rate and rhythm; No murmurs, rubs, or gallops  ABDOMEN: Soft, Nontender, Nondistended; Bowel sounds present, no pain or masses on palpation  NERVOUS SYSTEM:  Alert & Oriented X3  : voiding well  EXTREMITIES:  2+ Peripheral Pulses, No clubbing, cyanosis, or edema  SKIN: warm dry                          12.1   12.31 )-----------( 169      ( 20 Jan 2022 07:48 )             34.1     01-20    136  |  105  |  12  ----------------------------<  141<H>  3.3<L>   |  23  |  0.73    Ca    8.7      20 Jan 2022 07:48  Phos  2.2     01-19  Mg     1.9     01-19    TPro  6.1  /  Alb  2.5<L>  /  TBili  1.2  /  DBili  x   /  AST  19  /  ALT  25  /  AlkPhos  88  01-19    LIVER FUNCTIONS - ( 19 Jan 2022 09:06 )  Alb: 2.5 g/dL / Pro: 6.1 g/dL / ALK PHOS: 88 U/L / ALT: 25 U/L DA / AST: 19 U/L / GGT: x                   I&O's Summary    19 Jan 2022 07:01  -  20 Jan 2022 07:00  --------------------------------------------------------  IN: 0 mL / OUT: 1150 mL / NET: -1150 mL          Culture - Urine (collected 19 Jan 2022 04:29)  Source: Catheterized Catheterized  Final Report (20 Jan 2022 01:10):    <10,000 CFU/mL Normal Urogenital Luciana    Culture - Blood (collected 19 Jan 2022 04:08)  Source: .Blood Blood  Preliminary Report (20 Jan 2022 05:01):    No growth to date.    Culture - Blood (collected 19 Jan 2022 04:08)  Source: .Blood Blood  Preliminary Report (20 Jan 2022 05:01):    No growth to date.        CAPILLARY BLOOD GLUCOSE      RADIOLOGY & ADDITIONAL TESTS:                       PGY-1 Progress Note discussed with attending    CHIEF COMPLAINT & BRIEF HOSPITAL COURSE:  Patient is a 89yo Yakut speaking male with hx of BPH, presenting with urinary retention. Patient upon evaluation found to be afebrile, tachycardic to 105bpm, with bp of 135/69. Patient found to have leukocytosis of 27, with UTI. Patient admitted for sepsis 2/2 UTI.     INTERVAL HPI/OVERNIGHT EVENTS:   Patient was seen and examined at bedside. No acute events overnight reported. Fever spiked yesterday evening and Tmax [id number] F, total urine out put is 1150ml in 24 hours. Patient C/O low appetite, but denies flank/abdominal pain, fever, nausea or vomiting.         REVIEW OF SYSTEMS:  CONSTITUTIONAL: + decreased appetite, generalized weakness.  RESPIRATORY: No cough, wheezing, chills or hemoptysis; No shortness of breath  CARDIOVASCULAR: No chest pain, palpitations, dizziness, or leg swelling  GASTROINTESTINAL: No abdominal pain. No nausea, vomiting, or hematemesis; No diarrhea or constipation. No melena or hematochezia.  GENITOURINARY: + dysuria, No hematuria, urinary frequency  NEUROLOGICAL: No headaches, memory loss, loss of strength, numbness, or tremors  SKIN: No itching, burning, rashes, or lesions     MEDICATIONS  (STANDING):  cefTRIAXone   IVPB 1000 milliGRAM(s) IV Intermittent every 24 hours  heparin   Injectable 5000 Unit(s) SubCutaneous every 12 hours  insulin lispro (ADMELOG) corrective regimen sliding scale   SubCutaneous three times a day before meals  pantoprazole    Tablet 40 milliGRAM(s) Oral before breakfast  sodium chloride 0.9%. 1000 milliLiter(s) (75 mL/Hr) IV Continuous <Continuous>  tamsulosin 0.4 milliGRAM(s) Oral at bedtime    MEDICATIONS  (PRN):      Vital Signs Last 24 Hrs  T(C): 36.8 (20 Jan 2022 04:35), Max: 37.2 (19 Jan 2022 21:09)  T(F): 98.2 (20 Jan 2022 04:35), Max: 98.9 (19 Jan 2022 21:09)  HR: 99 (20 Jan 2022 04:35) (99 - 105)  BP: 112/62 (20 Jan 2022 04:35) (105/63 - 112/62)  BP(mean): --  RR: 16 (20 Jan 2022 04:35) (16 - 17)  SpO2: 99% (20 Jan 2022 04:35) (95% - 99%)    PHYSICAL EXAMINATION:  GENERAL: NAD, well built  HEAD:  Atraumatic, Normocephalic  EYES:  conjunctiva and sclera clear  NECK: Supple, No JVD, Normal thyroid  CHEST/LUNG: Clear to auscultation. Clear to percussion bilaterally; No rales, rhonchi, wheezing, or rubs  HEART: Regular rate and rhythm; No murmurs, rubs, or gallops  ABDOMEN: Soft, Nontender, Nondistended; Bowel sounds present, no pain or masses on palpation  NERVOUS SYSTEM:  Alert & Oriented X3  : voiding well, clear yellow urine + ramirez cath  EXTREMITIES:  2+ Peripheral Pulses, No clubbing, cyanosis, or edema  SKIN: warm dry                          12.1   12.31 )-----------( 169      ( 20 Jan 2022 07:48 )             34.1     01-20    136  |  105  |  12  ----------------------------<  141<H>  3.3<L>   |  23  |  0.73    Ca    8.7      20 Jan 2022 07:48  Phos  2.2     01-19  Mg     1.9     01-19    TPro  6.1  /  Alb  2.5<L>  /  TBili  1.2  /  DBili  x   /  AST  19  /  ALT  25  /  AlkPhos  88  01-19    LIVER FUNCTIONS - ( 19 Jan 2022 09:06 )  Alb: 2.5 g/dL / Pro: 6.1 g/dL / ALK PHOS: 88 U/L / ALT: 25 U/L DA / AST: 19 U/L / GGT: x                   I&O's Summary    19 Jan 2022 07:01  -  20 Jan 2022 07:00  --------------------------------------------------------  IN: 0 mL / OUT: 1150 mL / NET: -1150 mL          Culture - Urine (collected 19 Jan 2022 04:29)  Source: Catheterized Catheterized  Final Report (20 Jan 2022 01:10):    <10,000 CFU/mL Normal Urogenital Luciana    Culture - Blood (collected 19 Jan 2022 04:08)  Source: .Blood Blood  Preliminary Report (20 Jan 2022 05:01):    No growth to date.    Culture - Blood (collected 19 Jan 2022 04:08)  Source: .Blood Blood  Preliminary Report (20 Jan 2022 05:01):    No growth to date.        CAPILLARY BLOOD GLUCOSE      RADIOLOGY & ADDITIONAL TESTS:

## 2022-01-20 NOTE — PROGRESS NOTE ADULT - PROBLEM SELECTOR PLAN 1
- patient noted to meet Sepsis criteria (heart rate >90bpm, WBC 27K, and source of infection UA)  - complaining of flank pain and dysuria, resolved  - BCx negative X 2  - UCx, catheter specimen, <10,000 CFU/ml normal urogenital misbah  - C/w IVF NS@ 75cc/hr  - s/p 1 dose of rocephin in ED  - Continue with rocephin 1gram Q24H - patient noted to meet Sepsis criteria (heart rate >90bpm, WBC 27K, and source of infection UA)  - complaining of flank pain and dysuria, resolved  - BCx negative X 2  - UCx, catheter specimen, <10,000 CFU/ml normal urogenital misbah  - C/w IVF NS@ 75cc/hr  - s/p 1 dose of rocephin in ED  - Continue with rocephin 1gram Q24H    urine culture with normal misbah  will dc on PO abx for total of 5 days  DC planning for tomorrow

## 2022-01-20 NOTE — PROGRESS NOTE ADULT - PROBLEM SELECTOR PLAN 4
- patient noted to have COVID infection prior to admission, vaccinated   - saturating well on room air  - cxr showing Improved bilateral interstitial infiltrates compared to cxr in 11/21  - will hold off decadron and remdesivir   - monitor oxygenation status   - isolation precautions  pt with 2 month h/o COVID infection, will consult with infection control regarding need for isolation

## 2022-01-20 NOTE — PROGRESS NOTE ADULT - PROBLEM SELECTOR PLAN 3
- noted to have SCr of 1.48, prior SCr in November was WNL '   likely obstructive (h/o BPH, BUN/Cr < 15) vs mixed obstructive + pre-renal (dehydration 2/2 covid and poor PO intake)   SCr 1.48 > 0.98.,0.73, resolved with IV hydration and catheterization - noted to have SCr of 1.48, prior SCr in November was WNL '   likely obstructive (h/o BPH, BUN/Cr < 15) vs mixed obstructive + pre-renal (dehydration 2/2 covid and poor PO intake)   SCr 1.48 > 0.98.,0.73, resolved with IV hydration and [place]

## 2022-01-20 NOTE — PROGRESS NOTE ADULT - PROBLEM SELECTOR PLAN 2
- patient presenting with urinary retention  - s/p ramirez in ED draining 300cc clear urine,  - no suprapubic tenderness noted, no flank tenderness noted  - continue with flomax   - patient may need urology outpatient follow up upon discharge - patient presenting with urinary retention  - s/p ramirez in ED draining 300cc clear urine,  - no suprapubic tenderness noted, no flank tenderness noted  - continue with flomax   - patient may need urology outpatient follow up upon discharge    trial of void  pt voiding well, reported 500 cc since ramirez removed

## 2022-01-20 NOTE — PROGRESS NOTE ADULT - ATTENDING COMMENTS
Pt reports he feels better. No n/v/abd pain/LH    1. Sepsis s/t  2. UTI  3. acute urinary retention (resolved)  4. KHOA  5. prior COVID-19 pna  6. BPH    c/w ceftriaxone. urine culture was negative, however, with florid positive UA and suggestive symptomology  would continue to complete course of antibiotics  patient passed trial of void today  discharge 1/21/22
Patient seen/evaluated at bedside on 1/19/2022 w/  [id number]. I agree with the resident progress note/outlined plan of care. My independent findings and conclusions are documented.    Pt reports he feels better. No n/v/abd pain/LH    1. Sepsis s/t  2. UTI  3. acute urinary retention  4. KHOA  5. prior COVID-19 pna  6. BPH    c/w ceftriaxone  trial of void tomorrow  f/u urine culture

## 2022-01-20 NOTE — PROGRESS NOTE ADULT - PROBLEM SELECTOR PLAN 6
IMPROVE VTE score: 3  Will manage with: Heparin S/C    [ ] Previous VTE                                    3  [ ] Thrombophilia                                  2  [ x] Lower limb paralysis                        2  (unable to hold up >15 seconds)    [ ] Current Cancer (within 6 months)        2   [x] Immobilization > 24 hrs                    1  [ ] ICU/CCU stay > 24 hrs                      1  [x] Age > 60                                         1
IMPROVE VTE score: 3  Will manage with: Heparin S/C    [ ] Previous VTE                                    3  [ ] Thrombophilia                                  2  [ x] Lower limb paralysis                        2  (unable to hold up >15 seconds)    [ ] Current Cancer (within 6 months)        2   [x] Immobilization > 24 hrs                    1  [ ] ICU/CCU stay > 24 hrs                      1  [x] Age > 60                                         1

## 2022-01-21 ENCOUNTER — TRANSCRIPTION ENCOUNTER (OUTPATIENT)
Age: 87
End: 2022-01-21

## 2022-01-21 VITALS
OXYGEN SATURATION: 97 % | RESPIRATION RATE: 18 BRPM | SYSTOLIC BLOOD PRESSURE: 117 MMHG | DIASTOLIC BLOOD PRESSURE: 68 MMHG | HEART RATE: 88 BPM

## 2022-01-21 LAB
ANION GAP SERPL CALC-SCNC: 6 MMOL/L — SIGNIFICANT CHANGE UP (ref 5–17)
BUN SERPL-MCNC: 11 MG/DL — SIGNIFICANT CHANGE UP (ref 7–18)
CALCIUM SERPL-MCNC: 8.7 MG/DL — SIGNIFICANT CHANGE UP (ref 8.4–10.5)
CHLORIDE SERPL-SCNC: 105 MMOL/L — SIGNIFICANT CHANGE UP (ref 96–108)
CO2 SERPL-SCNC: 27 MMOL/L — SIGNIFICANT CHANGE UP (ref 22–31)
CREAT SERPL-MCNC: 0.75 MG/DL — SIGNIFICANT CHANGE UP (ref 0.5–1.3)
GLUCOSE BLDC GLUCOMTR-MCNC: 147 MG/DL — HIGH (ref 70–99)
GLUCOSE SERPL-MCNC: 156 MG/DL — HIGH (ref 70–99)
HCT VFR BLD CALC: 33.9 % — LOW (ref 39–50)
HGB BLD-MCNC: 11.9 G/DL — LOW (ref 13–17)
MCHC RBC-ENTMCNC: 30.5 PG — SIGNIFICANT CHANGE UP (ref 27–34)
MCHC RBC-ENTMCNC: 35.1 GM/DL — SIGNIFICANT CHANGE UP (ref 32–36)
MCV RBC AUTO: 86.9 FL — SIGNIFICANT CHANGE UP (ref 80–100)
NRBC # BLD: 0 /100 WBCS — SIGNIFICANT CHANGE UP (ref 0–0)
PLATELET # BLD AUTO: 204 K/UL — SIGNIFICANT CHANGE UP (ref 150–400)
POTASSIUM SERPL-MCNC: 3.2 MMOL/L — LOW (ref 3.5–5.3)
POTASSIUM SERPL-SCNC: 3.2 MMOL/L — LOW (ref 3.5–5.3)
RBC # BLD: 3.9 M/UL — LOW (ref 4.2–5.8)
RBC # FLD: 15.2 % — HIGH (ref 10.3–14.5)
SODIUM SERPL-SCNC: 138 MMOL/L — SIGNIFICANT CHANGE UP (ref 135–145)
WBC # BLD: 8.37 K/UL — SIGNIFICANT CHANGE UP (ref 3.8–10.5)
WBC # FLD AUTO: 8.37 K/UL — SIGNIFICANT CHANGE UP (ref 3.8–10.5)

## 2022-01-21 PROCEDURE — 84100 ASSAY OF PHOSPHORUS: CPT

## 2022-01-21 PROCEDURE — 82340 ASSAY OF CALCIUM IN URINE: CPT

## 2022-01-21 PROCEDURE — 82962 GLUCOSE BLOOD TEST: CPT

## 2022-01-21 PROCEDURE — 80053 COMPREHEN METABOLIC PANEL: CPT

## 2022-01-21 PROCEDURE — 71045 X-RAY EXAM CHEST 1 VIEW: CPT

## 2022-01-21 PROCEDURE — 80048 BASIC METABOLIC PNL TOTAL CA: CPT

## 2022-01-21 PROCEDURE — 87086 URINE CULTURE/COLONY COUNT: CPT

## 2022-01-21 PROCEDURE — 36415 COLL VENOUS BLD VENIPUNCTURE: CPT

## 2022-01-21 PROCEDURE — 85025 COMPLETE CBC W/AUTO DIFF WBC: CPT

## 2022-01-21 PROCEDURE — 87040 BLOOD CULTURE FOR BACTERIA: CPT

## 2022-01-21 PROCEDURE — 99285 EMERGENCY DEPT VISIT HI MDM: CPT | Mod: 25

## 2022-01-21 PROCEDURE — 99239 HOSP IP/OBS DSCHRG MGMT >30: CPT | Mod: GC

## 2022-01-21 PROCEDURE — 84133 ASSAY OF URINE POTASSIUM: CPT

## 2022-01-21 PROCEDURE — 81001 URINALYSIS AUTO W/SCOPE: CPT

## 2022-01-21 PROCEDURE — 84105 ASSAY OF URINE PHOSPHORUS: CPT

## 2022-01-21 PROCEDURE — 82570 ASSAY OF URINE CREATININE: CPT

## 2022-01-21 PROCEDURE — 87635 SARS-COV-2 COVID-19 AMP PRB: CPT

## 2022-01-21 PROCEDURE — 83605 ASSAY OF LACTIC ACID: CPT

## 2022-01-21 PROCEDURE — 84300 ASSAY OF URINE SODIUM: CPT

## 2022-01-21 PROCEDURE — 96360 HYDRATION IV INFUSION INIT: CPT

## 2022-01-21 PROCEDURE — 83735 ASSAY OF MAGNESIUM: CPT

## 2022-01-21 PROCEDURE — 85027 COMPLETE CBC AUTOMATED: CPT

## 2022-01-21 RX ORDER — CEFUROXIME AXETIL 250 MG
1 TABLET ORAL
Qty: 4 | Refills: 0
Start: 2022-01-21 | End: 2022-01-22

## 2022-01-21 RX ORDER — TAMSULOSIN HYDROCHLORIDE 0.4 MG/1
1 CAPSULE ORAL
Qty: 0 | Refills: 0 | DISCHARGE

## 2022-01-21 RX ORDER — POLYETHYLENE GLYCOL 3350 17 G/17G
17 POWDER, FOR SOLUTION ORAL DAILY
Refills: 0 | Status: DISCONTINUED | OUTPATIENT
Start: 2022-01-21 | End: 2022-01-21

## 2022-01-21 RX ORDER — POTASSIUM CHLORIDE 20 MEQ
20 PACKET (EA) ORAL ONCE
Refills: 0 | Status: COMPLETED | OUTPATIENT
Start: 2022-01-21 | End: 2022-01-21

## 2022-01-21 RX ORDER — OXYBUTYNIN CHLORIDE 5 MG
1 TABLET ORAL
Qty: 0 | Refills: 0 | DISCHARGE

## 2022-01-21 RX ORDER — POTASSIUM CHLORIDE 20 MEQ
40 PACKET (EA) ORAL ONCE
Refills: 0 | Status: COMPLETED | OUTPATIENT
Start: 2022-01-21 | End: 2022-01-21

## 2022-01-21 RX ORDER — TAMSULOSIN HYDROCHLORIDE 0.4 MG/1
1 CAPSULE ORAL
Qty: 30 | Refills: 0
Start: 2022-01-21 | End: 2022-02-19

## 2022-01-21 RX ADMIN — SODIUM CHLORIDE 75 MILLILITER(S): 9 INJECTION INTRAMUSCULAR; INTRAVENOUS; SUBCUTANEOUS at 05:42

## 2022-01-21 RX ADMIN — PANTOPRAZOLE SODIUM 40 MILLIGRAM(S): 20 TABLET, DELAYED RELEASE ORAL at 05:40

## 2022-01-21 RX ADMIN — HEPARIN SODIUM 5000 UNIT(S): 5000 INJECTION INTRAVENOUS; SUBCUTANEOUS at 05:41

## 2022-01-21 RX ADMIN — POLYETHYLENE GLYCOL 3350 17 GRAM(S): 17 POWDER, FOR SOLUTION ORAL at 11:15

## 2022-01-21 RX ADMIN — Medication 1: at 07:45

## 2022-01-21 RX ADMIN — Medication 40 MILLIEQUIVALENT(S): at 11:15

## 2022-01-21 RX ADMIN — CEFTRIAXONE 100 MILLIGRAM(S): 500 INJECTION, POWDER, FOR SOLUTION INTRAMUSCULAR; INTRAVENOUS at 05:41

## 2022-01-21 NOTE — PHYSICAL THERAPY INITIAL EVALUATION ADULT - LEVEL OF INDEPENDENCE: SUPINE/SIT, REHAB EVAL
Patient has appointment with me tomorrow she should keep the appointment will discuss further at that time   independent

## 2022-01-21 NOTE — DISCHARGE NOTE PROVIDER - HOSPITAL COURSE
89 YO Vietnamese speaking male with hx of BPH, presenting with urinary retention. Patient upon evaluation found to be afebrile, tachycardic to 105bpm, with bp of 135/69. Patient found to have leukocytosis of 27, with UTI. Patient admitted for sepsis 2/2 UTI, treated with IV ceftriaxone. Septic workup negative (BCx ngtd, UCx with normal misbah) but pt treated for UTI given SIRS criteria and positive UA Patient with urinary retention, voiding clear yellow urine s/p ramirez catheter. Patient improved with abx while hospitalized, WBC normalized, no longer tachycardic, no signs of sepsis at time of discharge. Ramirez removed on 1/20, patient noted to be voiding well without sings of retention.      Problem/Plan - 1:  ·  Problem: UTI (urinary tract infection).   ·  Plan: - patient noted to meet Sepsis criteria (heart rate >90bpm, WBC 27K, and source of infection UA)  - complaining of flank pain and dysuria, resolved  - BCx negative X 2  - UCx, catheter specimen, <10,000 CFU/ml normal urogenital misbah  - C/w IVF NS@ 75cc/hr  - s/p 1 dose of rocephin in ED  - Continue with rocephin 1gram Q24H    urine culture with normal misbah  will dc on PO abx for total of 5 days  DC planning for tomorrow.     Problem/Plan - 2:  ·  Problem: Urinary retention.   ·  Plan: - patient presenting with urinary retention  - s/p ramirez in ED draining 300cc clear urine,  - no suprapubic tenderness noted, no flank tenderness noted  - continue with flomax   - patient may need urology outpatient follow up upon discharge    trial of void  pt voiding well, reported 500 cc since ramirez removed.     Problem/Plan - 3:  ·  Problem: KHOA (acute kidney injury).   ·  Plan: - noted to have SCr of 1.48, prior SCr in November was WNL '   likely obstructive (h/o BPH, BUN/Cr < 15) vs mixed obstructive + pre-renal (dehydration 2/2 covid and poor PO intake)   SCr 1.48 > 0.98.,0.73, resolved with IV hydration and Ramirez.     Problem/Plan - 4:  ·  Problem: 2019 novel coronavirus disease (COVID-19).   ·  Plan: - patient noted to have COVID infection prior to admission, vaccinated   - saturating well on room air  - cxr showing Improved bilateral interstitial infiltrates compared to cxr in 11/21  - will hold off decadron and remdesivir   - monitor oxygenation status   - isolation precautions  pt with 2 month h/o COVID infection, will consult with infection control regarding need for isolation.     Problem/Plan - 5:  ·  Problem: BPH (benign prostatic hyperplasia).   ·  Plan: - continue with flomax  - plan as above.   89 YO Mexican speaking male with hx of BPH, presenting with urinary retention. Patient upon evaluation found to be afebrile, tachycardic to 105bpm, with bp of 135/69. Patient found to have leukocytosis of 27, with UTI. Patient admitted for sepsis 2/2 UTI, treated with IV ceftriaxone. Septic workup negative (BCx ngtd, UCx with normal misbah) but pt treated for UTI given SIRS criteria and positive UA Patient with urinary retention, voiding clear yellow urine s/p ramirez catheter. Patient improved with abx while hospitalized, WBC normalized, no longer tachycardic, no signs of sepsis at time of discharge. Ramirez removed on 1/20, patient noted to be voiding well without sings of retention. For h/o BPH, patient continued on flomax.  Patient's home dose of oxybutynin held on admission, pt urinating well. consider restarting oxybutynin as needed. Pt presented with KHOA which most likely due to obstruction, BUN/Cr < 15, improved with catheterization and IV hydration. Patient also admitted with COVID positive status, saturating well on room air, CXR with improved bilateral infiltrates since 11/21. Patient was deemed medically stable for discharge by primary medical team. 89 YO Mauritian speaking male with hx of BPH, presenting with urinary retention. Patient upon evaluation found to be afebrile, tachycardic to 105bpm, with bp of 135/69. Patient found to have leukocytosis of 27, with UTI. Patient admitted for sepsis 2/2 UTI, treated with IV ceftriaxone. Septic workup negative (BCx ngtd, UCx with normal misbah) but pt treated for UTI given SIRS criteria and positive UA Patient with urinary retention, voiding clear yellow urine s/p ramirez catheter. Patient improved with abx while hospitalized, WBC normalized, no longer tachycardic, no signs of sepsis at time of discharge. Ramirez removed on 1/20, patient noted to be voiding well without sings of retention. For h/o BPH, patient continued on flomax.  Patient's home dose of oxybutynin held on admission, pt urinating well. consider restarting oxybutynin as needed. Pt presented with KHOA which most likely due to obstruction, BUN/Cr < 15, improved with catheterization and IV hydration. Patient also admitted with COVID positive status, saturating well on room air, CXR with improved bilateral infiltrates since 11/21. Patient was deemed medically stable for discharge by primary medical team.     Med Attending Addendum  Patient seen and evaluated at bedside on the day of discharge 1/21/2022 via the video .  Patient endorsed feeling well. Denied abd pain/ suprapubic pain, nausea/vomiting. Reports overall decreased oral intake, but attempting to eat more. Encouraged to take oral supplementation with Ensure as tolerated. Physical exam without acute findings: thin non-toxic male, benign abdomen,   Update provided to daughter and son-in-law on the phone prior to discharge  32 min spent on bedside exam, coordinating care 89 YO Bahamian speaking male with hx of BPH, presenting with urinary retention. Patient upon evaluation found to be afebrile, tachycardic to 105bpm, with bp of 135/69. Patient found to have leukocytosis of 27, with UTI. Patient admitted for sepsis 2/2 UTI, treated with IV ceftriaxone. Septic workup negative (BCx ngtd, UCx with normal misbah) but pt treated for UTI given SIRS criteria and positive UA Patient with urinary retention, voiding clear yellow urine s/p ramirez catheter. Patient improved with abx while hospitalized, WBC normalized, no longer tachycardic, no signs of sepsis at time of discharge. Ramirez removed on 1/20, patient noted to be voiding well without sings of retention. For h/o BPH, patient continued on flomax.  Patient's home dose of oxybutynin held on admission, pt urinating well. consider restarting oxybutynin as needed. Pt presented with KHOA which most likely due to obstruction, BUN/Cr < 15, improved with catheterization and IV hydration. Patient also admitted with COVID positive status, saturating well on room air, CXR with improved bilateral infiltrates since 11/21. Patient was deemed medically stable for discharge by primary medical team.     Med Attending Addendum  Patient seen and evaluated at bedside on the day of discharge 1/21/2022 via the video .  Patient endorsed feeling well. Denied abd pain/ suprapubic pain, nausea/vomiting. Reports overall decreased oral intake, but attempting to eat more. Encouraged to take oral supplementation with Ensure as tolerated. Patient passed trial of void. Physical exam without acute findings: thin non-toxic male, benign abdomen,     1. Sepsis present on admission s/t  2. UTI  3. acute urinary retention (resolved)  4. KHOA  5. prior COVID-19 pna  6. BPH    c/w ceftriaxone. urine culture was negative, however, with florid positive UA and suggestive symptomology  would continue to complete course of antibiotics  patient passed trial of void today  discharge 1/21/22 .    32 min spent on bedside exam, coordinating care

## 2022-01-21 NOTE — DISCHARGE NOTE PROVIDER - CARE PROVIDER_API CALL
MARTA RIDLEY  [facility]  [address], [address]  [place] [address]  Phone: [phone]  Fax: ([phone]  Follow Up Time:

## 2022-01-21 NOTE — DISCHARGE NOTE PROVIDER - NSDCMRMEDTOKEN_GEN_ALL_CORE_FT
aspirin 81 mg oral tablet, chewable: 1 tab(s) orally once a day  oxybutynin 15 mg/24 hr oral tablet, extended release: 1 tab(s) orally once a day  tamsulosin 0.4 mg oral capsule: 1 cap(s) orally once a day   aspirin 81 mg oral tablet, chewable: 1 tab(s) orally once a day  tamsulosin 0.4 mg oral capsule: 1 cap(s) orally once a day   aspirin 81 mg oral tablet, chewable: 1 tab(s) orally once a day  cefuroxime 250 mg oral tablet: 1 tab(s) orally 2 times a day   tamsulosin 0.4 mg oral capsule: 1 cap(s) orally once a day

## 2022-01-21 NOTE — DISCHARGE NOTE PROVIDER - NSDCCPCAREPLAN_GEN_ALL_CORE_FT
PRINCIPAL DISCHARGE DIAGNOSIS  Diagnosis: Acute UTI  Assessment and Plan of Treatment: You were admitted to the hospital for urinary tract infection and given intravenous antibiotics. You are recommended to continue oral antibiotics for 2 days, cefuroxime 250 mg ONE TABLET TWO TIMES A DAY FOR TWO DAYS. You are recommended to follow up with your primary care provider within 1-2 weeks of hospital discharge.      SECONDARY DISCHARGE DIAGNOSES  Diagnosis: Retention, urine  Assessment and Plan of Treatment: You were admitted to the hospital with urinary retention. Your retention was complicated by acute kidney injury which resolved with intravenous hydration and urinary catheterization. You were able to urinate on your own after removal of the catheter. You are recommended to continue taking flomax one tablet nightly and follow up with your primary care provider within 1-2 weeks of hospital discharge.    Diagnosis: 2019 novel coronavirus disease (COVID-19)  Assessment and Plan of Treatment: You were admitted to the hospital with COVID-19 and you had appropriate levels of oxygen without supplementation. You are recommended to follow up with your primary care provider within 1-2 weeks of hospital discharge.    Diagnosis: KHOA (acute kidney injury)  Assessment and Plan of Treatment: You were noted with acute kidney injury which resolved with intravenous hydration and urinary catheterization. You are recommended to continue taking flomax one tablet nightly and follow up with your primary care provider within 1-2 weeks of hospital discharge.     PRINCIPAL DISCHARGE DIAGNOSIS  Diagnosis: Acute UTI  Assessment and Plan of Treatment: You were admitted to the hospital for urinary tract infection and given intravenous antibiotics. You are recommended to continue oral antibiotics for 2 days, cefuroxime 250 mg ONE TABLET TWO TIMES A DAY FOR TWO DAYS. You are recommended to follow up with your primary care provider within 1-2 weeks of hospital discharge.      SECONDARY DISCHARGE DIAGNOSES  Diagnosis: Retention, urine  Assessment and Plan of Treatment: You were admitted to the hospital with urinary retention. Your retention was complicated by acute kidney injury which resolved with intravenous hydration and urinary catheterization. You were able to urinate on your own after removal of the catheter. Your home medication Oxybutynin was held upon hospitalization as it can cause urinary retention. You are recommended to continue taking Flomax one tablet nightly and follow up with your primary care provider within 1-2 weeks of hospital discharge.    Diagnosis: KHOA (acute kidney injury)  Assessment and Plan of Treatment: You were noted with acute kidney injury which resolved with intravenous hydration and urinary catheterization. You are recommended to continue taking flomax one tablet nightly and follow up with your primary care provider within 1-2 weeks of hospital discharge.    Diagnosis: 2019 novel coronavirus disease (COVID-19)  Assessment and Plan of Treatment: You were admitted to the hospital with COVID-19 and you had appropriate levels of oxygen without supplementation. You are recommended to follow up with your primary care provider within 1-2 weeks of hospital discharge.

## 2022-01-21 NOTE — DISCHARGE NOTE NURSING/CASE MANAGEMENT/SOCIAL WORK - PATIENT PORTAL LINK FT
You can access the FollowMyHealth Patient Portal offered by [facility] by registering at the following website: [email or web address]. By joining [facility]’s FollowMyHealth portal, you will also be able to view your health information using other applications (apps) compatible with our system.

## 2022-01-21 NOTE — DISCHARGE NOTE NURSING/CASE MANAGEMENT/SOCIAL WORK - NSDCPEFALRISK_GEN_ALL_CORE
For information on Fall & Injury Prevention, visit: [email or web address] OR  [email or web address] OR  https://www.cdc.gov/steadi/patient.html

## 2022-01-28 NOTE — ED ADULT TRIAGE NOTE - HISTORY OF COVID-19 VACCINATION
Nursing Note  Marilyn Ortega presents today to [facility] for:   Chief Complaint   Patient presents with     Infusion     IVF     During today's [facility] appointment, orders from Dr. Pack were completed.  Frequency: as needed    Progress note:  Patient identification verified by name and date of birth.  Assessment completed.  Vitals recorded in Doc Flowsheets.  Patient was provided with education regarding medication/procedure and possible side effects.  Patient verbalized understanding.     present during visit today: Not Applicable.    Treatment Conditions: Non-applicable.    Premedications: were not ordered.    Drug Waste Record: No    Infusion length and rate:  infusion given over approximately 1 hours    Labs: were drawn per orders.     Vascular access: peripheral IV was placed by vascular access nurse.    Is the next appt scheduled? prn  Asymptomatic COVID test completed? no    Post Infusion Assessment:  Patient tolerated infusion without incident.     Discharge Plan:   Follow up plan of care with: ordering provider as scheduled.  Discharge instructions were reviewed with patient.  Patient/representative verbalized understanding of discharge instructions and all questions answered.  Patient discharged from [facility] in stable condition.    Kelley Wang RN       Administrations This Visit     0.9% sodium chloride BOLUS     Admin Date  01/28/2022 Action  New Bag Dose  1,000 mL Route  Intravenous Administered By  Kelley Wang RN                  /78 (BP Location: Right arm)   Pulse 89   Temp 97.9  F (36.6  C) (Oral)   Resp 16       
Yes

## 2022-09-06 NOTE — ED PROVIDER NOTE - DOMESTIC TRAVEL HIGH RISK QUESTION
not eat or drink anything after midnight, the night before your surgery. This is extremely important for your safety. Take a bath (or shower) the night before your surgery and you may brush your teeth the morning of your surgery. You will be scheduled to arrive at the hospital 2 hours before your surgery, or follow your surgeon's instructions. Dress comfortably. Wear loose clothing that will be easy to remove and comfortable for your trip home. You may wear eyeglasses or contacts but bring your cases with you as they must be remove before your surgery. Hearing aids and dentures will need to be removed before your surgery. Do not wear any jewelry, including body jewelry. All jewelry will need to be removed prior to your surgery. Do not wear fingernail polish or make-up. It is best not to bring any valuables with you. If you are to stay in the hospital overnight, bring your robe, slippers and personal toiletries that you may need. POSTOPERATIVE GUIDELINES AFTER RECEIVING ANESTHESIA    If you are to go home after your surgery, you will need a responsible adult to drive you home. You will not be able to take public transportation after your discharge from the Operative Care Unit unless you are accompanied by a        responsible adult. On returning home, be sure to follow your physician's orders regarding diet, activity and medications. Remember, surgery with general anesthesia or sedation may leave you sleepy, very tired and with a decreased appetite for 12 to 24 hours. If you develop any post-surgical complications or problems, call your surgeon or [facility] Emergency [facility] ([phone]). [address] [place] [address] for Surgery Patients-Revised 6-    Visitors for surgery patients are essential for the patient's emotional well-being and care       post operatively.     2.   Visitor Expectations and Limitations        VISITORS MUST WEAR MASKS AT ALL TIMES. NO Cloth masks allowed. 3.  One visitor allowed with patients in the preop/postop rooms. 4.  A second visitor may sit in the waiting area. 5.  No children under 13 allowed in the pre-post op areas unless they are the patient. 6.  Two people may be with an underage surgical/procedural patient in preop/postop        room. 7.  If you are admitted to the hospital post operatively, there are NO RESTRICTIONS on       the floor at this time. 8.  If you are admitted to ICU postoperatively, you may have one visitor in the room from        7A-7P. A second visitor may sit in the ICU waiting room.   There can be no overnight No

## 2022-09-15 PROBLEM — N40.0 BENIGN PROSTATIC HYPERPLASIA WITHOUT LOWER URINARY TRACT SYMPTOMS: Chronic | Status: ACTIVE | Noted: 2022-01-18

## 2022-09-19 DIAGNOSIS — B35.3 TINEA PEDIS: ICD-10-CM

## 2022-09-19 DIAGNOSIS — R26.2 DIFFICULTY IN WALKING, NOT ELSEWHERE CLASSIFIED: ICD-10-CM

## 2022-09-19 DIAGNOSIS — Z98.890 OTHER SPECIFIED POSTPROCEDURAL STATES: ICD-10-CM

## 2022-09-19 PROBLEM — Z00.00 ENCOUNTER FOR PREVENTIVE HEALTH EXAMINATION: Status: ACTIVE | Noted: 2022-09-19

## 2022-09-28 ENCOUNTER — APPOINTMENT (OUTPATIENT)
Dept: PODIATRY | Facility: CLINIC | Age: 87
End: 2022-09-28

## 2022-09-28 VITALS — WEIGHT: 140 LBS | BODY MASS INDEX: 24.8 KG/M2 | HEIGHT: 63 IN

## 2022-09-28 PROCEDURE — 11721 DEBRIDE NAIL 6 OR MORE: CPT

## 2022-09-28 RX ORDER — ASPIRIN/CALCIUM/MAG/ALUMINUM 81 MG
81 TABLET, DELAYED RELEASE (ENTERIC COATED) ORAL
Refills: 0 | Status: ACTIVE | COMMUNITY

## 2022-09-28 RX ORDER — OXYBUTYNIN CHLORIDE 15 MG/1
15 TABLET, EXTENDED RELEASE ORAL
Refills: 0 | Status: ACTIVE | COMMUNITY

## 2022-09-28 RX ORDER — CICLOPIROX 80 MG/ML
8 SOLUTION TOPICAL
Qty: 1 | Refills: 3 | Status: ACTIVE | COMMUNITY
Start: 2022-09-28 | End: 1900-01-01

## 2022-10-03 NOTE — HISTORY OF PRESENT ILLNESS
[Sneakers] : arnaldo [FreeTextEntry1] : Patient presents today because of painful onychomycotic nails. They are painful and getting much worse especially at the hallux. They cause the patient grief and sensitivity in shoe gear. \par

## 2022-10-03 NOTE — PHYSICAL EXAM
[1+] : left foot posterior tibialis 1+ [2+] : left foot dorsalis pedis 2+ [Sensation] : the sensory exam was normal to light touch and pinprick [No Focal Deficits] : no focal deficits [Deep Tendon Reflexes (DTR)] : deep tendon reflexes were 2+ and symmetric [Motor Exam] : the motor exam was normal [Delayed in the Right Toes] : capillary refills normal in right toes [Delayed in the Left Toes] : capillary refills normal in the left toes [FreeTextEntry3] : Thin, atrophic skin. [de-identified] : Unremarkable [FreeTextEntry1] : Onychomycosis in nails 1, 2, 3, 4 and on the left and 1, 4 and 5 on the right. The hallux nails are painful at the tip and tibial border. 2 and 3 are painful at the tip and top, 4 and 5 are painful at the lateral border and top.

## 2022-10-03 NOTE — ASSESSMENT
[FreeTextEntry1] : \par Treatment: I aggressively debrided and debulked mycotic nails using a small straight nail splitter and rotary jesus.  They were debrided and ground thin to make them comfortable in shoe gear.  Patient will continue topical antifungals. Follow-up as needed.

## 2022-10-06 RX ORDER — TAVABOROLE 43.5 MG/ML
5 SOLUTION TOPICAL
Qty: 1 | Refills: 3 | Status: ACTIVE | COMMUNITY
Start: 2022-10-06 | End: 1900-01-01

## 2023-03-22 ENCOUNTER — APPOINTMENT (OUTPATIENT)
Dept: PODIATRY | Facility: CLINIC | Age: 88
End: 2023-03-22
Payer: MEDICARE

## 2023-03-22 DIAGNOSIS — M79.675 PAIN IN RIGHT TOE(S): ICD-10-CM

## 2023-03-22 DIAGNOSIS — M79.674 PAIN IN RIGHT TOE(S): ICD-10-CM

## 2023-03-22 DIAGNOSIS — B35.1 TINEA UNGUIUM: ICD-10-CM

## 2023-03-22 PROCEDURE — 11721 DEBRIDE NAIL 6 OR MORE: CPT

## 2023-03-24 NOTE — PHYSICAL EXAM
[1+] : left foot posterior tibialis 1+ [2+] : left foot dorsalis pedis 2+ [Sensation] : the sensory exam was normal to light touch and pinprick [No Focal Deficits] : no focal deficits [Deep Tendon Reflexes (DTR)] : deep tendon reflexes were 2+ and symmetric [Motor Exam] : the motor exam was normal [Delayed in the Right Toes] : capillary refills normal in right toes [Delayed in the Left Toes] : capillary refills normal in the left toes [FreeTextEntry3] : Thin, atrophic skin. [FreeTextEntry1] : Onychomycosis in nails 1, 2, 3, 4 and on the left and 1, 4 and 5 on the right. The hallux nails are painful at the tip and tibial border. 2 and 3 are painful at the tip and top, 4 and 5 are painful at the lateral border and top.

## 2023-03-24 NOTE — ASSESSMENT
[FreeTextEntry1] : \par Impression: Painful onychomycosis.\par \par Treatment: I manually and mechanically debrided mycotic nails using a small straight nail splitter and rotary jesus. The nails were aggressively debrided and debulked to make them comfortable in shoe gear. I ePrescribed Ciclopirox. Follow-up as needed.

## 2023-03-24 NOTE — HISTORY OF PRESENT ILLNESS
[Sneakers] : arnaldo [FreeTextEntry1] : Patient presents today with her son who translates the entire encounter. He complains of painful onychomycotic nails. There is improvement but he is still having pain in over 6 nails. Patient last saw Dr. Quarles, his PCP, 8 months ago.\par

## 2023-11-17 NOTE — DISCHARGE NOTE PROVIDER - NSDCQMSTROKE_NEU_ALL_CORE
No Will identify 2 coping skills that assist with focus on reality Will identify 2 coping skills that help mitigate hallucinations Other... Will identify 2 coping skills that help mitigate hallucinations Will identify 2 coping skills that assist with focus on reality Will identify 2 coping skills that assist with focus on reality Will identify 2 coping skills that help mitigate hallucinations Will identify 2 coping skills that help mitigate hallucinations Other... Other... Other... Other... Will identify 2 coping skills that help mitigate hallucinations Will identify 2 coping skills that help mitigate hallucinations Other... Will identify 2 coping skills that assist with focus on reality Will identify 2 coping skills that assist with focus on reality Will identify 2 coping skills that help mitigate hallucinations Will identify 2 coping skills that help mitigate hallucinations Will identify 2 coping skills that help mitigate hallucinations Will identify 2 coping skills that help mitigate hallucinations Will identify 2 coping skills that help mitigate hallucinations Will identify 2 coping skills that help mitigate hallucinations Will identify 2 coping skills that assist with focus on reality Will identify 2 coping skills that help mitigate hallucinations Will identify 2 coping skills that assist with focus on reality Will identify 2 coping skills that help mitigate hallucinations Other... Will identify 2 coping skills that help mitigate hallucinations Will identify 2 coping skills that help mitigate hallucinations Other... Will identify 2 coping skills that help mitigate hallucinations Will identify 2 coping skills that help mitigate hallucinations Will identify 2 coping skills that assist with focus on reality Other... Will identify 2 coping skills that help mitigate hallucinations Other... Will identify 2 coping skills that help mitigate hallucinations Will identify 2 coping skills that assist with focus on reality Will identify 2 coping skills that assist with focus on reality Will identify 2 coping skills that help mitigate hallucinations Will identify 2 coping skills that help mitigate hallucinations Will identify 2 coping skills that help mitigate hallucinations Will identify 2 coping skills that help mitigate hallucinations Other... Other... Will identify 2 coping skills that assist with focus on reality Will identify 2 coping skills that help mitigate hallucinations Will identify 2 coping skills that help mitigate hallucinations Will identify 2 coping skills that help mitigate hallucinations Will identify 2 coping skills that assist with focus on reality

## 2024-03-20 ENCOUNTER — APPOINTMENT (OUTPATIENT)
Dept: PODIATRY | Facility: CLINIC | Age: 89
End: 2024-03-20

## 2024-08-07 ENCOUNTER — APPOINTMENT (OUTPATIENT)
Dept: PODIATRY | Facility: CLINIC | Age: 89
End: 2024-08-07

## 2024-08-07 PROCEDURE — 99212 OFFICE O/P EST SF 10 MIN: CPT

## 2024-08-12 PROBLEM — M20.40 HAMMERTOE: Status: ACTIVE | Noted: 2024-08-09

## 2024-08-12 NOTE — HISTORY OF PRESENT ILLNESS
[FreeTextEntry1] : Patient presents today for evaluation and care of left foot issues. He has onychomycosis. He cut the nails himself. It got infected while he was on vacation. They put him on antibiotics.

## 2024-08-12 NOTE — PHYSICAL EXAM
[1+] : left foot posterior tibialis 1+ [2+] : left foot dorsalis pedis 2+ [Sensation] : the sensory exam was normal to light touch and pinprick [No Focal Deficits] : no focal deficits [Deep Tendon Reflexes (DTR)] : deep tendon reflexes were 2+ and symmetric [Motor Exam] : the motor exam was normal [Delayed in the Right Toes] : capillary refills normal in right toes [Delayed in the Left Toes] : capillary refills normal in the left toes [FreeTextEntry3] : Thin, atrophic skin. [de-identified] : He has deformity with 2nd toe. The 3rd toe underlaps the 2nd toe and he has painful bunion and 2nd hammertoe. [FreeTextEntry1] : The infection is resolved. He has onychomycosis.  He has onychomycosis in all nails. There is no sign of infection other than onychomycosis and some mild interdigital tinea.

## 2024-08-12 NOTE — PHYSICAL EXAM
[1+] : left foot posterior tibialis 1+ [2+] : left foot dorsalis pedis 2+ [Sensation] : the sensory exam was normal to light touch and pinprick [No Focal Deficits] : no focal deficits [Deep Tendon Reflexes (DTR)] : deep tendon reflexes were 2+ and symmetric [Motor Exam] : the motor exam was normal [Delayed in the Right Toes] : capillary refills normal in right toes [Delayed in the Left Toes] : capillary refills normal in the left toes [FreeTextEntry3] : Thin, atrophic skin. [de-identified] : He has deformity with 2nd toe. The 3rd toe underlaps the 2nd toe and he has painful bunion and 2nd hammertoe. [FreeTextEntry1] : The infection is resolved. He has onychomycosis.  He has onychomycosis in all nails. There is no sign of infection other than onychomycosis and some mild interdigital tinea.

## 2024-08-12 NOTE — ASSESSMENT
[FreeTextEntry1] : Impression: Onychomycosis. Renaldoe. Tinea pedis.  Treatment: I manually and mechanically debrided mycotic nails x10 using a small straight nail splitter and rotary jesus without incident. I recommended [employer] catalog, silicone toe separators to maintain position of toes. I ePrescribed Ketoconazole to be used daily on the nails and interspaces. I want him to soak with warm water and Epsom salt for the next few weeks.

## 2024-08-12 NOTE — PHYSICAL EXAM
[1+] : left foot posterior tibialis 1+ [2+] : left foot dorsalis pedis 2+ [Sensation] : the sensory exam was normal to light touch and pinprick [No Focal Deficits] : no focal deficits [Deep Tendon Reflexes (DTR)] : deep tendon reflexes were 2+ and symmetric [Motor Exam] : the motor exam was normal [Delayed in the Right Toes] : capillary refills normal in right toes [Delayed in the Left Toes] : capillary refills normal in the left toes [FreeTextEntry3] : Thin, atrophic skin. [de-identified] : He has deformity with 2nd toe. The 3rd toe underlaps the 2nd toe and he has painful bunion and 2nd hammertoe. [FreeTextEntry1] : The infection is resolved. He has onychomycosis.  He has onychomycosis in all nails. There is no sign of infection other than onychomycosis and some mild interdigital tinea.

## 2024-10-23 ENCOUNTER — APPOINTMENT (OUTPATIENT)
Dept: PODIATRY | Facility: CLINIC | Age: 89
End: 2024-10-23

## 2024-10-23 DIAGNOSIS — L60.0 INGROWING NAIL: ICD-10-CM

## 2024-10-23 PROCEDURE — 99212 OFFICE O/P EST SF 10 MIN: CPT

## 2024-10-28 PROBLEM — L60.0 INGROWN NAIL: Status: ACTIVE | Noted: 2024-10-25
